# Patient Record
Sex: FEMALE | Race: WHITE | NOT HISPANIC OR LATINO | Employment: OTHER
[De-identification: names, ages, dates, MRNs, and addresses within clinical notes are randomized per-mention and may not be internally consistent; named-entity substitution may affect disease eponyms.]

---

## 2017-01-12 ENCOUNTER — RECORDS - HEALTHEAST (OUTPATIENT)
Dept: ADMINISTRATIVE | Facility: OTHER | Age: 77
End: 2017-01-12

## 2017-01-18 ENCOUNTER — COMMUNICATION - HEALTHEAST (OUTPATIENT)
Dept: INTERNAL MEDICINE | Facility: CLINIC | Age: 77
End: 2017-01-18

## 2017-05-03 ENCOUNTER — OFFICE VISIT - HEALTHEAST (OUTPATIENT)
Dept: INTERNAL MEDICINE | Facility: CLINIC | Age: 77
End: 2017-05-03

## 2017-05-03 DIAGNOSIS — I83.92 VARICOSE VEINS OF LEFT LOWER EXTREMITY: ICD-10-CM

## 2017-05-03 ASSESSMENT — MIFFLIN-ST. JEOR: SCORE: 1152.15

## 2017-05-19 ENCOUNTER — RECORDS - HEALTHEAST (OUTPATIENT)
Dept: ADMINISTRATIVE | Facility: OTHER | Age: 77
End: 2017-05-19

## 2017-06-09 ENCOUNTER — COMMUNICATION - HEALTHEAST (OUTPATIENT)
Dept: INTERNAL MEDICINE | Facility: CLINIC | Age: 77
End: 2017-06-09

## 2017-06-12 ENCOUNTER — COMMUNICATION - HEALTHEAST (OUTPATIENT)
Dept: INTERNAL MEDICINE | Facility: CLINIC | Age: 77
End: 2017-06-12

## 2017-06-20 ENCOUNTER — COMMUNICATION - HEALTHEAST (OUTPATIENT)
Dept: INTERNAL MEDICINE | Facility: CLINIC | Age: 77
End: 2017-06-20

## 2017-06-24 ENCOUNTER — COMMUNICATION - HEALTHEAST (OUTPATIENT)
Dept: INTERNAL MEDICINE | Facility: CLINIC | Age: 77
End: 2017-06-24

## 2017-06-29 ENCOUNTER — RECORDS - HEALTHEAST (OUTPATIENT)
Dept: ADMINISTRATIVE | Facility: OTHER | Age: 77
End: 2017-06-29

## 2017-07-18 ENCOUNTER — RECORDS - HEALTHEAST (OUTPATIENT)
Dept: ADMINISTRATIVE | Facility: OTHER | Age: 77
End: 2017-07-18

## 2017-07-26 ENCOUNTER — COMMUNICATION - HEALTHEAST (OUTPATIENT)
Dept: INTERNAL MEDICINE | Facility: CLINIC | Age: 77
End: 2017-07-26

## 2017-07-31 ENCOUNTER — AMBULATORY - HEALTHEAST (OUTPATIENT)
Dept: INTERNAL MEDICINE | Facility: CLINIC | Age: 77
End: 2017-07-31

## 2017-07-31 ENCOUNTER — COMMUNICATION - HEALTHEAST (OUTPATIENT)
Dept: INTERNAL MEDICINE | Facility: CLINIC | Age: 77
End: 2017-07-31

## 2017-08-01 ENCOUNTER — AMBULATORY - HEALTHEAST (OUTPATIENT)
Dept: INTERNAL MEDICINE | Facility: CLINIC | Age: 77
End: 2017-08-01

## 2017-08-31 ENCOUNTER — RECORDS - HEALTHEAST (OUTPATIENT)
Dept: ADMINISTRATIVE | Facility: OTHER | Age: 77
End: 2017-08-31

## 2017-09-05 ENCOUNTER — RECORDS - HEALTHEAST (OUTPATIENT)
Dept: ADMINISTRATIVE | Facility: OTHER | Age: 77
End: 2017-09-05

## 2017-09-07 ENCOUNTER — COMMUNICATION - HEALTHEAST (OUTPATIENT)
Dept: INTERNAL MEDICINE | Facility: CLINIC | Age: 77
End: 2017-09-07

## 2017-09-07 DIAGNOSIS — I10 HTN (HYPERTENSION): ICD-10-CM

## 2017-09-10 ENCOUNTER — COMMUNICATION - HEALTHEAST (OUTPATIENT)
Dept: INTERNAL MEDICINE | Facility: CLINIC | Age: 77
End: 2017-09-10

## 2017-09-21 ENCOUNTER — RECORDS - HEALTHEAST (OUTPATIENT)
Dept: ADMINISTRATIVE | Facility: OTHER | Age: 77
End: 2017-09-21

## 2017-09-25 ENCOUNTER — OFFICE VISIT - HEALTHEAST (OUTPATIENT)
Dept: INTERNAL MEDICINE | Facility: CLINIC | Age: 77
End: 2017-09-25

## 2017-09-25 DIAGNOSIS — E78.2 MIXED HYPERLIPIDEMIA: ICD-10-CM

## 2017-09-25 DIAGNOSIS — Z79.899 MEDICATION MANAGEMENT: ICD-10-CM

## 2017-09-25 DIAGNOSIS — Z23 NEED FOR VACCINATION: ICD-10-CM

## 2017-09-25 DIAGNOSIS — I10 HTN (HYPERTENSION): ICD-10-CM

## 2017-09-25 ASSESSMENT — MIFFLIN-ST. JEOR: SCORE: 1139.08

## 2017-10-02 ENCOUNTER — COMMUNICATION - HEALTHEAST (OUTPATIENT)
Dept: INTERNAL MEDICINE | Facility: CLINIC | Age: 77
End: 2017-10-02

## 2017-10-02 ENCOUNTER — HOSPITAL ENCOUNTER (OUTPATIENT)
Dept: ULTRASOUND IMAGING | Facility: CLINIC | Age: 77
Discharge: HOME | End: 2017-10-02
Attending: INTERNAL MEDICINE

## 2017-10-02 DIAGNOSIS — I10 HTN (HYPERTENSION): ICD-10-CM

## 2017-10-03 ENCOUNTER — AMBULATORY - HEALTHEAST (OUTPATIENT)
Dept: INTERNAL MEDICINE | Facility: CLINIC | Age: 77
End: 2017-10-03

## 2017-10-03 ENCOUNTER — COMMUNICATION - HEALTHEAST (OUTPATIENT)
Dept: INTERNAL MEDICINE | Facility: CLINIC | Age: 77
End: 2017-10-03

## 2017-10-03 DIAGNOSIS — E78.2 MIXED HYPERLIPIDEMIA: ICD-10-CM

## 2018-01-02 ENCOUNTER — COMMUNICATION - HEALTHEAST (OUTPATIENT)
Dept: INTERNAL MEDICINE | Facility: CLINIC | Age: 78
End: 2018-01-02

## 2018-01-02 ENCOUNTER — OFFICE VISIT - HEALTHEAST (OUTPATIENT)
Dept: INTERNAL MEDICINE | Facility: CLINIC | Age: 78
End: 2018-01-02

## 2018-01-02 DIAGNOSIS — F32.A DEPRESSION: ICD-10-CM

## 2018-01-02 DIAGNOSIS — F41.9 ANXIETY: ICD-10-CM

## 2018-01-02 ASSESSMENT — MIFFLIN-ST. JEOR: SCORE: 1138.54

## 2018-01-29 ENCOUNTER — COMMUNICATION - HEALTHEAST (OUTPATIENT)
Dept: SCHEDULING | Facility: CLINIC | Age: 78
End: 2018-01-29

## 2018-01-30 ENCOUNTER — COMMUNICATION - HEALTHEAST (OUTPATIENT)
Dept: INTERNAL MEDICINE | Facility: CLINIC | Age: 78
End: 2018-01-30

## 2018-01-30 ENCOUNTER — AMBULATORY - HEALTHEAST (OUTPATIENT)
Dept: INTERNAL MEDICINE | Facility: CLINIC | Age: 78
End: 2018-01-30

## 2018-01-30 ENCOUNTER — OFFICE VISIT - HEALTHEAST (OUTPATIENT)
Dept: INTERNAL MEDICINE | Facility: CLINIC | Age: 78
End: 2018-01-30

## 2018-01-30 DIAGNOSIS — Z86.711 PERSONAL HISTORY OF PE (PULMONARY EMBOLISM): ICD-10-CM

## 2018-01-30 DIAGNOSIS — I65.29 CAROTID STENOSIS: ICD-10-CM

## 2018-01-30 DIAGNOSIS — I49.9 IRREGULAR HEART RATE: ICD-10-CM

## 2018-01-30 DIAGNOSIS — R07.9 CHEST PAIN: ICD-10-CM

## 2018-01-30 DIAGNOSIS — R00.1 BRADYCARDIA: ICD-10-CM

## 2018-01-30 LAB
ANION GAP SERPL CALCULATED.3IONS-SCNC: 9 MMOL/L (ref 5–18)
BUN SERPL-MCNC: 17 MG/DL (ref 8–28)
CALCIUM SERPL-MCNC: 9.4 MG/DL (ref 8.5–10.5)
CHLORIDE BLD-SCNC: 106 MMOL/L (ref 98–107)
CO2 SERPL-SCNC: 27 MMOL/L (ref 22–31)
CREAT SERPL-MCNC: 0.83 MG/DL (ref 0.6–1.1)
GFR SERPL CREATININE-BSD FRML MDRD: >60 ML/MIN/1.73M2
GLUCOSE BLD-MCNC: 83 MG/DL (ref 70–125)
POTASSIUM BLD-SCNC: 4.3 MMOL/L (ref 3.5–5)
SODIUM SERPL-SCNC: 142 MMOL/L (ref 136–145)
TSH SERPL DL<=0.005 MIU/L-ACNC: 1.09 UIU/ML (ref 0.3–5)

## 2018-01-30 ASSESSMENT — MIFFLIN-ST. JEOR: SCORE: 1129.47

## 2018-01-31 LAB
ATRIAL RATE - MUSE: 51 BPM
DIASTOLIC BLOOD PRESSURE - MUSE: NORMAL MMHG
INTERPRETATION ECG - MUSE: NORMAL
P AXIS - MUSE: 62 DEGREES
PR INTERVAL - MUSE: 178 MS
QRS DURATION - MUSE: 96 MS
QT - MUSE: 442 MS
QTC - MUSE: 407 MS
R AXIS - MUSE: -38 DEGREES
SYSTOLIC BLOOD PRESSURE - MUSE: NORMAL MMHG
T AXIS - MUSE: -85 DEGREES
VENTRICULAR RATE- MUSE: 51 BPM

## 2018-02-07 ENCOUNTER — COMMUNICATION - HEALTHEAST (OUTPATIENT)
Dept: INTERNAL MEDICINE | Facility: CLINIC | Age: 78
End: 2018-02-07

## 2018-02-07 ENCOUNTER — HOSPITAL ENCOUNTER (OUTPATIENT)
Dept: CARDIOLOGY | Facility: HOSPITAL | Age: 78
Discharge: HOME | End: 2018-02-07
Attending: INTERNAL MEDICINE

## 2018-02-07 DIAGNOSIS — R00.1 BRADYCARDIA: ICD-10-CM

## 2018-02-07 DIAGNOSIS — I65.29 CAROTID STENOSIS: ICD-10-CM

## 2018-02-07 DIAGNOSIS — Z86.711 PERSONAL HISTORY OF PE (PULMONARY EMBOLISM): ICD-10-CM

## 2018-02-07 DIAGNOSIS — R07.9 CHEST PAIN: ICD-10-CM

## 2018-02-07 DIAGNOSIS — I49.9 IRREGULAR HEART RATE: ICD-10-CM

## 2018-02-07 LAB
AORTIC ROOT: 2.9 CM
AORTIC VALVE MEAN VELOCITY: 115 CM/S
ASCENDING AORTA: 3.1 CM
AV CUSP SEPERATION: 1.4 CM
AV CUSP SEPERATION: 1.4 CM
AV DIMENSIONLESS INDEX VTI: 0.6
AV MEAN GRADIENT: 6 MMHG
AV PEAK GRADIENT: 9.2 MMHG
AV VALVE AREA: 2.4 CM2
AV VELOCITY RATIO: 0.6
BSA FOR ECHO PROCEDURE: 1.76 M2
CV ECHO HEIGHT: 64 IN
CV ECHO WEIGHT: 152 LBS
DOP CALC AO PEAK VEL: 152 CM/S
DOP CALC AO VTI: 39.9 CM
DOP CALC LVOT AREA: 3.8 CM2
DOP CALC LVOT DIAMETER: 2.2 CM
DOP CALC LVOT PEAK VEL: 91.6 CM/S
DOP CALC LVOT STROKE VOLUME: 97.3 CM3
DOP CALC MV VTI: 25.2 CM
DOP CALCLVOT PEAK VEL VTI: 25.6 CM
EJECTION FRACTION: 68 % (ref 55–75)
FRACTIONAL SHORTENING: 32.3 % (ref 28–44)
INTERVENTRICULAR SEPTUM IN END DIASTOLE: 1.21 CM (ref 0.6–0.9)
IVS/PW RATIO: 1
LA AREA 1: 19.7 CM2
LA AREA 2: 16.9 CM2
LEFT ATRIUM LENGTH: 4.6 CM
LEFT ATRIUM SIZE: 2.8 CM
LEFT ATRIUM VOLUME INDEX: 35 ML/M2
LEFT ATRIUM VOLUME: 61.5 ML
LEFT VENTRICLE CARDIAC INDEX: 2.7 L/MIN/M2
LEFT VENTRICLE CARDIAC OUTPUT: 4.8 L/MIN
LEFT VENTRICLE DIASTOLIC VOLUME INDEX: 39.3 CM3/M2 (ref 34–74)
LEFT VENTRICLE DIASTOLIC VOLUME: 69.2 CM3 (ref 46–106)
LEFT VENTRICLE HEART RATE: 49 BPM
LEFT VENTRICLE MASS INDEX: 97.3 G/M2
LEFT VENTRICLE SYSTOLIC VOLUME INDEX: 12.4 CM3/M2 (ref 11–31)
LEFT VENTRICLE SYSTOLIC VOLUME: 21.9 CM3 (ref 14–42)
LEFT VENTRICULAR INTERNAL DIMENSION IN DIASTOLE: 4.06 CM (ref 3.8–5.2)
LEFT VENTRICULAR INTERNAL DIMENSION IN SYSTOLE: 2.75 CM (ref 2.2–3.5)
LEFT VENTRICULAR MASS: 171.3 G
LEFT VENTRICULAR OUTFLOW TRACT MEAN GRADIENT: 2 MMHG
LEFT VENTRICULAR OUTFLOW TRACT MEAN VELOCITY: 66.9 CM/S
LEFT VENTRICULAR OUTFLOW TRACT PEAK GRADIENT: 3 MMHG
LEFT VENTRICULAR POSTERIOR WALL IN END DIASTOLE: 1.21 CM (ref 0.6–0.9)
LV STROKE VOLUME INDEX: 55.3 ML/M2
MITRAL VALVE DECELERATION SLOPE: 1780 MM/S2
MITRAL VALVE E/A RATIO: 0.8
MITRAL VALVE MEAN INFLOW VELOCITY: 34.6 CM/S
MITRAL VALVE PEAK VELOCITY: 75.2 CM/S
MITRAL VALVE PRESSURE HALF-TIME: 98 MS
MV AREA VTI: 3.86 CM2
MV AVERAGE E/E' RATIO: 9.4 CM/S
MV DECELERATION TIME: 254 MS
MV E'TISSUE VEL-LAT: 7.16 CM/S
MV E'TISSUE VEL-MED: 5.22 CM/S
MV LATERAL E/E' RATIO: 8.1
MV MEAN GRADIENT: 1 MMHG
MV MEDIAL E/E' RATIO: 11.1
MV PEAK A VELOCITY: 72.3 CM/S
MV PEAK E VELOCITY: 58.2 CM/S
MV PEAK GRADIENT: 2.3 MMHG
MV VALVE AREA BY CONTINUITY EQUATION: 3.9 CM2
MV VALVE AREA PRESSURE 1/2 METHOD: 2.2 CM2
NUC REST DIASTOLIC VOLUME INDEX: 2432 LBS
NUC REST SYSTOLIC VOLUME INDEX: 64 IN

## 2018-02-07 ASSESSMENT — MIFFLIN-ST. JEOR: SCORE: 1129.47

## 2018-03-28 ENCOUNTER — COMMUNICATION - HEALTHEAST (OUTPATIENT)
Dept: INTERNAL MEDICINE | Facility: CLINIC | Age: 78
End: 2018-03-28

## 2018-06-05 ENCOUNTER — COMMUNICATION - HEALTHEAST (OUTPATIENT)
Dept: INTERNAL MEDICINE | Facility: CLINIC | Age: 78
End: 2018-06-05

## 2018-06-05 DIAGNOSIS — F32.A DEPRESSION: ICD-10-CM

## 2018-06-06 ENCOUNTER — COMMUNICATION - HEALTHEAST (OUTPATIENT)
Dept: INTERNAL MEDICINE | Facility: CLINIC | Age: 78
End: 2018-06-06

## 2018-06-06 DIAGNOSIS — F32.A DEPRESSION: ICD-10-CM

## 2018-07-18 ENCOUNTER — OFFICE VISIT - HEALTHEAST (OUTPATIENT)
Dept: INTERNAL MEDICINE | Facility: CLINIC | Age: 78
End: 2018-07-18

## 2018-07-18 DIAGNOSIS — I10 BENIGN ESSENTIAL HYPERTENSION: ICD-10-CM

## 2018-07-18 DIAGNOSIS — M79.671 RIGHT FOOT PAIN: ICD-10-CM

## 2018-07-18 ASSESSMENT — MIFFLIN-ST. JEOR: SCORE: 1143.08

## 2018-07-23 ENCOUNTER — RECORDS - HEALTHEAST (OUTPATIENT)
Dept: ADMINISTRATIVE | Facility: OTHER | Age: 78
End: 2018-07-23

## 2018-09-10 ENCOUNTER — RECORDS - HEALTHEAST (OUTPATIENT)
Dept: ADMINISTRATIVE | Facility: OTHER | Age: 78
End: 2018-09-10

## 2018-09-12 ENCOUNTER — RECORDS - HEALTHEAST (OUTPATIENT)
Dept: ADMINISTRATIVE | Facility: OTHER | Age: 78
End: 2018-09-12

## 2018-09-27 ENCOUNTER — COMMUNICATION - HEALTHEAST (OUTPATIENT)
Dept: INTERNAL MEDICINE | Facility: CLINIC | Age: 78
End: 2018-09-27

## 2018-09-27 DIAGNOSIS — I10 HTN (HYPERTENSION): ICD-10-CM

## 2018-09-27 DIAGNOSIS — E78.2 MIXED HYPERLIPIDEMIA: ICD-10-CM

## 2018-10-15 ENCOUNTER — RECORDS - HEALTHEAST (OUTPATIENT)
Dept: ADMINISTRATIVE | Facility: OTHER | Age: 78
End: 2018-10-15

## 2018-10-22 ENCOUNTER — OFFICE VISIT - HEALTHEAST (OUTPATIENT)
Dept: INTERNAL MEDICINE | Facility: CLINIC | Age: 78
End: 2018-10-22

## 2018-10-22 DIAGNOSIS — K62.9 ANAL LESION: ICD-10-CM

## 2018-10-22 DIAGNOSIS — Z01.818 PREOP GENERAL PHYSICAL EXAM: ICD-10-CM

## 2018-10-22 DIAGNOSIS — D68.4 COAGULATION FACTOR DEFICIENCY SYNDROME (H): ICD-10-CM

## 2018-10-22 DIAGNOSIS — I82.409 DVT (DEEP VENOUS THROMBOSIS) (H): ICD-10-CM

## 2018-10-22 LAB — HGB BLD-MCNC: 14 G/DL (ref 12–16)

## 2018-10-22 ASSESSMENT — MIFFLIN-ST. JEOR: SCORE: 1147.61

## 2018-10-23 LAB
ANION GAP SERPL CALCULATED.3IONS-SCNC: 12 MMOL/L (ref 5–18)
BUN SERPL-MCNC: 27 MG/DL (ref 8–28)
CALCIUM SERPL-MCNC: 9.9 MG/DL (ref 8.5–10.5)
CHLORIDE BLD-SCNC: 105 MMOL/L (ref 98–107)
CO2 SERPL-SCNC: 26 MMOL/L (ref 22–31)
CREAT SERPL-MCNC: 1.52 MG/DL (ref 0.6–1.1)
GFR SERPL CREATININE-BSD FRML MDRD: 33 ML/MIN/1.73M2
GLUCOSE BLD-MCNC: 92 MG/DL (ref 70–125)
POTASSIUM BLD-SCNC: 3.8 MMOL/L (ref 3.5–5)
SODIUM SERPL-SCNC: 143 MMOL/L (ref 136–145)

## 2018-10-25 ENCOUNTER — COMMUNICATION - HEALTHEAST (OUTPATIENT)
Dept: INTERNAL MEDICINE | Facility: CLINIC | Age: 78
End: 2018-10-25

## 2018-10-30 ENCOUNTER — COMMUNICATION - HEALTHEAST (OUTPATIENT)
Dept: INTERNAL MEDICINE | Facility: CLINIC | Age: 78
End: 2018-10-30

## 2018-11-06 ENCOUNTER — AMBULATORY - HEALTHEAST (OUTPATIENT)
Dept: INTERNAL MEDICINE | Facility: CLINIC | Age: 78
End: 2018-11-06

## 2018-11-06 ENCOUNTER — COMMUNICATION - HEALTHEAST (OUTPATIENT)
Dept: INTERNAL MEDICINE | Facility: CLINIC | Age: 78
End: 2018-11-06

## 2018-11-06 DIAGNOSIS — N18.1 CRF (CHRONIC RENAL FAILURE), STAGE 1: ICD-10-CM

## 2018-11-12 ENCOUNTER — RECORDS - HEALTHEAST (OUTPATIENT)
Dept: ADMINISTRATIVE | Facility: OTHER | Age: 78
End: 2018-11-12

## 2018-11-19 ENCOUNTER — COMMUNICATION - HEALTHEAST (OUTPATIENT)
Dept: TELEHEALTH | Facility: CLINIC | Age: 78
End: 2018-11-19

## 2018-11-19 ENCOUNTER — AMBULATORY - HEALTHEAST (OUTPATIENT)
Dept: LAB | Facility: CLINIC | Age: 78
End: 2018-11-19

## 2018-11-19 ENCOUNTER — COMMUNICATION - HEALTHEAST (OUTPATIENT)
Dept: INTERNAL MEDICINE | Facility: CLINIC | Age: 78
End: 2018-11-19

## 2018-11-19 DIAGNOSIS — N18.1 CRF (CHRONIC RENAL FAILURE), STAGE 1: ICD-10-CM

## 2018-11-19 LAB
ANION GAP SERPL CALCULATED.3IONS-SCNC: 12 MMOL/L (ref 5–18)
BUN SERPL-MCNC: 17 MG/DL (ref 8–28)
CALCIUM SERPL-MCNC: 9.9 MG/DL (ref 8.5–10.5)
CHLORIDE BLD-SCNC: 106 MMOL/L (ref 98–107)
CO2 SERPL-SCNC: 27 MMOL/L (ref 22–31)
CREAT SERPL-MCNC: 0.85 MG/DL (ref 0.6–1.1)
GFR SERPL CREATININE-BSD FRML MDRD: >60 ML/MIN/1.73M2
GLUCOSE BLD-MCNC: 97 MG/DL (ref 70–125)
POTASSIUM BLD-SCNC: 3.8 MMOL/L (ref 3.5–5)
SODIUM SERPL-SCNC: 145 MMOL/L (ref 136–145)

## 2018-11-27 ENCOUNTER — COMMUNICATION - HEALTHEAST (OUTPATIENT)
Dept: INTERNAL MEDICINE | Facility: CLINIC | Age: 78
End: 2018-11-27

## 2018-11-27 DIAGNOSIS — I10 HTN (HYPERTENSION): ICD-10-CM

## 2019-03-08 ENCOUNTER — COMMUNICATION - HEALTHEAST (OUTPATIENT)
Dept: INTERNAL MEDICINE | Facility: CLINIC | Age: 79
End: 2019-03-08

## 2019-03-08 DIAGNOSIS — I10 HTN (HYPERTENSION): ICD-10-CM

## 2019-05-27 ENCOUNTER — RECORDS - HEALTHEAST (OUTPATIENT)
Dept: ADMINISTRATIVE | Facility: OTHER | Age: 79
End: 2019-05-27

## 2019-05-28 ENCOUNTER — RECORDS - HEALTHEAST (OUTPATIENT)
Dept: ADMINISTRATIVE | Facility: OTHER | Age: 79
End: 2019-05-28

## 2019-05-29 ENCOUNTER — COMMUNICATION - HEALTHEAST (OUTPATIENT)
Dept: INTERNAL MEDICINE | Facility: CLINIC | Age: 79
End: 2019-05-29

## 2019-05-30 ENCOUNTER — RECORDS - HEALTHEAST (OUTPATIENT)
Dept: ADMINISTRATIVE | Facility: OTHER | Age: 79
End: 2019-05-30

## 2019-05-31 ENCOUNTER — RECORDS - HEALTHEAST (OUTPATIENT)
Dept: ADMINISTRATIVE | Facility: OTHER | Age: 79
End: 2019-05-31

## 2019-06-03 ENCOUNTER — COMMUNICATION - HEALTHEAST (OUTPATIENT)
Dept: INTERNAL MEDICINE | Facility: CLINIC | Age: 79
End: 2019-06-03

## 2019-06-03 ENCOUNTER — RECORDS - HEALTHEAST (OUTPATIENT)
Dept: ADMINISTRATIVE | Facility: OTHER | Age: 79
End: 2019-06-03

## 2019-06-04 ENCOUNTER — COMMUNICATION - HEALTHEAST (OUTPATIENT)
Dept: INTERNAL MEDICINE | Facility: CLINIC | Age: 79
End: 2019-06-04

## 2019-06-05 ENCOUNTER — RECORDS - HEALTHEAST (OUTPATIENT)
Dept: ADMINISTRATIVE | Facility: OTHER | Age: 79
End: 2019-06-05

## 2019-06-10 ENCOUNTER — RECORDS - HEALTHEAST (OUTPATIENT)
Dept: ADMINISTRATIVE | Facility: OTHER | Age: 79
End: 2019-06-10

## 2019-06-11 ENCOUNTER — RECORDS - HEALTHEAST (OUTPATIENT)
Dept: ADMINISTRATIVE | Facility: OTHER | Age: 79
End: 2019-06-11

## 2019-06-12 ENCOUNTER — RECORDS - HEALTHEAST (OUTPATIENT)
Dept: ADMINISTRATIVE | Facility: OTHER | Age: 79
End: 2019-06-12

## 2019-06-13 ENCOUNTER — RECORDS - HEALTHEAST (OUTPATIENT)
Dept: ADMINISTRATIVE | Facility: OTHER | Age: 79
End: 2019-06-13

## 2019-06-14 ENCOUNTER — RECORDS - HEALTHEAST (OUTPATIENT)
Dept: ADMINISTRATIVE | Facility: OTHER | Age: 79
End: 2019-06-14

## 2019-06-17 ENCOUNTER — COMMUNICATION - HEALTHEAST (OUTPATIENT)
Dept: INTERNAL MEDICINE | Facility: CLINIC | Age: 79
End: 2019-06-17

## 2019-06-17 DIAGNOSIS — E78.2 MIXED HYPERLIPIDEMIA: ICD-10-CM

## 2019-06-17 DIAGNOSIS — I10 HTN (HYPERTENSION): ICD-10-CM

## 2019-06-18 ENCOUNTER — COMMUNICATION - HEALTHEAST (OUTPATIENT)
Dept: INTERNAL MEDICINE | Facility: CLINIC | Age: 79
End: 2019-06-18

## 2019-06-19 ENCOUNTER — RECORDS - HEALTHEAST (OUTPATIENT)
Dept: ADMINISTRATIVE | Facility: OTHER | Age: 79
End: 2019-06-19

## 2019-06-21 ENCOUNTER — RECORDS - HEALTHEAST (OUTPATIENT)
Dept: ADMINISTRATIVE | Facility: OTHER | Age: 79
End: 2019-06-21

## 2019-06-24 ENCOUNTER — RECORDS - HEALTHEAST (OUTPATIENT)
Dept: ADMINISTRATIVE | Facility: OTHER | Age: 79
End: 2019-06-24

## 2019-06-26 ENCOUNTER — RECORDS - HEALTHEAST (OUTPATIENT)
Dept: ADMINISTRATIVE | Facility: OTHER | Age: 79
End: 2019-06-26

## 2019-07-02 ENCOUNTER — COMMUNICATION - HEALTHEAST (OUTPATIENT)
Dept: INTERNAL MEDICINE | Facility: CLINIC | Age: 79
End: 2019-07-02

## 2019-07-09 ENCOUNTER — RECORDS - HEALTHEAST (OUTPATIENT)
Dept: ADMINISTRATIVE | Facility: OTHER | Age: 79
End: 2019-07-09

## 2019-07-10 ENCOUNTER — RECORDS - HEALTHEAST (OUTPATIENT)
Dept: ADMINISTRATIVE | Facility: OTHER | Age: 79
End: 2019-07-10

## 2019-07-15 ENCOUNTER — RECORDS - HEALTHEAST (OUTPATIENT)
Dept: ADMINISTRATIVE | Facility: OTHER | Age: 79
End: 2019-07-15

## 2019-07-16 ENCOUNTER — RECORDS - HEALTHEAST (OUTPATIENT)
Dept: ADMINISTRATIVE | Facility: OTHER | Age: 79
End: 2019-07-16

## 2019-07-22 ENCOUNTER — RECORDS - HEALTHEAST (OUTPATIENT)
Dept: ADMINISTRATIVE | Facility: OTHER | Age: 79
End: 2019-07-22

## 2019-07-24 ENCOUNTER — COMMUNICATION - HEALTHEAST (OUTPATIENT)
Dept: INTERNAL MEDICINE | Facility: CLINIC | Age: 79
End: 2019-07-24

## 2019-07-25 ENCOUNTER — COMMUNICATION - HEALTHEAST (OUTPATIENT)
Dept: SCHEDULING | Facility: CLINIC | Age: 79
End: 2019-07-25

## 2019-07-29 ENCOUNTER — OFFICE VISIT - HEALTHEAST (OUTPATIENT)
Dept: INTERNAL MEDICINE | Facility: CLINIC | Age: 79
End: 2019-07-29

## 2019-07-29 ENCOUNTER — RECORDS - HEALTHEAST (OUTPATIENT)
Dept: ADMINISTRATIVE | Facility: OTHER | Age: 79
End: 2019-07-29

## 2019-07-29 DIAGNOSIS — F41.9 ANXIETY: ICD-10-CM

## 2019-07-29 DIAGNOSIS — I82.5Y9 CHRONIC DEEP VEIN THROMBOSIS (DVT) OF PROXIMAL VEIN OF LOWER EXTREMITY, UNSPECIFIED LATERALITY (H): ICD-10-CM

## 2019-07-29 DIAGNOSIS — E78.5 DYSLIPIDEMIA: ICD-10-CM

## 2019-07-29 DIAGNOSIS — R00.2 PALPITATIONS: ICD-10-CM

## 2019-07-29 DIAGNOSIS — I10 BENIGN ESSENTIAL HYPERTENSION: ICD-10-CM

## 2019-07-29 LAB
ANION GAP SERPL CALCULATED.3IONS-SCNC: 12 MMOL/L (ref 5–18)
ATRIAL RATE - MUSE: 62 BPM
BUN SERPL-MCNC: 12 MG/DL (ref 8–28)
CALCIUM SERPL-MCNC: 10.4 MG/DL (ref 8.5–10.5)
CHLORIDE BLD-SCNC: 103 MMOL/L (ref 98–107)
CHOLEST SERPL-MCNC: 266 MG/DL
CO2 SERPL-SCNC: 25 MMOL/L (ref 22–31)
CREAT SERPL-MCNC: 0.75 MG/DL (ref 0.6–1.1)
DIASTOLIC BLOOD PRESSURE - MUSE: NORMAL MMHG
FASTING STATUS PATIENT QL REPORTED: YES
GFR SERPL CREATININE-BSD FRML MDRD: >60 ML/MIN/1.73M2
GLUCOSE BLD-MCNC: 89 MG/DL (ref 70–125)
HDLC SERPL-MCNC: 85 MG/DL
INR PPP: 2.21 (ref 0.9–1.1)
INTERPRETATION ECG - MUSE: NORMAL
LDLC SERPL CALC-MCNC: 148 MG/DL
MAGNESIUM SERPL-MCNC: 1.9 MG/DL (ref 1.8–2.6)
P AXIS - MUSE: 37 DEGREES
POTASSIUM BLD-SCNC: 3.5 MMOL/L (ref 3.5–5)
PR INTERVAL - MUSE: 150 MS
QRS DURATION - MUSE: 92 MS
QT - MUSE: 438 MS
QTC - MUSE: 444 MS
R AXIS - MUSE: -39 DEGREES
SODIUM SERPL-SCNC: 140 MMOL/L (ref 136–145)
SYSTOLIC BLOOD PRESSURE - MUSE: NORMAL MMHG
T AXIS - MUSE: 247 DEGREES
TRIGL SERPL-MCNC: 166 MG/DL
TSH SERPL DL<=0.005 MIU/L-ACNC: 0.77 UIU/ML (ref 0.3–5)
VENTRICULAR RATE- MUSE: 62 BPM

## 2019-07-30 ENCOUNTER — COMMUNICATION - HEALTHEAST (OUTPATIENT)
Dept: INTERNAL MEDICINE | Facility: CLINIC | Age: 79
End: 2019-07-30

## 2019-08-06 ENCOUNTER — COMMUNICATION - HEALTHEAST (OUTPATIENT)
Dept: INTERNAL MEDICINE | Facility: CLINIC | Age: 79
End: 2019-08-06

## 2019-08-07 ENCOUNTER — COMMUNICATION - HEALTHEAST (OUTPATIENT)
Dept: INTERNAL MEDICINE | Facility: CLINIC | Age: 79
End: 2019-08-07

## 2019-08-13 ENCOUNTER — RECORDS - HEALTHEAST (OUTPATIENT)
Dept: ADMINISTRATIVE | Facility: OTHER | Age: 79
End: 2019-08-13

## 2019-08-15 ENCOUNTER — RECORDS - HEALTHEAST (OUTPATIENT)
Dept: ADMINISTRATIVE | Facility: OTHER | Age: 79
End: 2019-08-15

## 2019-08-19 ENCOUNTER — RECORDS - HEALTHEAST (OUTPATIENT)
Dept: ADMINISTRATIVE | Facility: OTHER | Age: 79
End: 2019-08-19

## 2019-08-20 ENCOUNTER — RECORDS - HEALTHEAST (OUTPATIENT)
Dept: ADMINISTRATIVE | Facility: OTHER | Age: 79
End: 2019-08-20

## 2019-08-22 ENCOUNTER — RECORDS - HEALTHEAST (OUTPATIENT)
Dept: ADMINISTRATIVE | Facility: OTHER | Age: 79
End: 2019-08-22

## 2019-08-22 ENCOUNTER — COMMUNICATION - HEALTHEAST (OUTPATIENT)
Dept: INTERNAL MEDICINE | Facility: CLINIC | Age: 79
End: 2019-08-22

## 2019-08-25 ENCOUNTER — COMMUNICATION - HEALTHEAST (OUTPATIENT)
Dept: INTERNAL MEDICINE | Facility: CLINIC | Age: 79
End: 2019-08-25

## 2019-08-25 DIAGNOSIS — I82.5Y9 CHRONIC DEEP VEIN THROMBOSIS (DVT) OF PROXIMAL VEIN OF LOWER EXTREMITY, UNSPECIFIED LATERALITY (H): ICD-10-CM

## 2019-08-26 ENCOUNTER — RECORDS - HEALTHEAST (OUTPATIENT)
Dept: ADMINISTRATIVE | Facility: OTHER | Age: 79
End: 2019-08-26

## 2019-08-26 ENCOUNTER — HOSPITAL ENCOUNTER (OUTPATIENT)
Dept: ULTRASOUND IMAGING | Facility: HOSPITAL | Age: 79
Discharge: HOME | End: 2019-08-26

## 2019-08-26 ENCOUNTER — RECORDS - HEALTHEAST (OUTPATIENT)
Dept: RADIOLOGY | Facility: CLINIC | Age: 79
End: 2019-08-26

## 2019-08-26 DIAGNOSIS — Z09 FOLLOW UP: ICD-10-CM

## 2019-08-28 ENCOUNTER — OFFICE VISIT - HEALTHEAST (OUTPATIENT)
Dept: INTERNAL MEDICINE | Facility: CLINIC | Age: 79
End: 2019-08-28

## 2019-08-28 DIAGNOSIS — D68.4 COAGULATION FACTOR DEFICIENCY SYNDROME (H): ICD-10-CM

## 2019-08-28 DIAGNOSIS — I10 BENIGN ESSENTIAL HYPERTENSION: ICD-10-CM

## 2019-08-28 DIAGNOSIS — F41.9 ANXIETY: ICD-10-CM

## 2019-08-28 DIAGNOSIS — I82.5Y9 CHRONIC DEEP VEIN THROMBOSIS (DVT) OF PROXIMAL VEIN OF LOWER EXTREMITY, UNSPECIFIED LATERALITY (H): ICD-10-CM

## 2019-08-28 ASSESSMENT — MIFFLIN-ST. JEOR: SCORE: 1134

## 2019-08-29 ENCOUNTER — COMMUNICATION - HEALTHEAST (OUTPATIENT)
Dept: INTERNAL MEDICINE | Facility: CLINIC | Age: 79
End: 2019-08-29

## 2019-08-29 DIAGNOSIS — D68.4 COAGULATION FACTOR DEFICIENCY SYNDROME (H): ICD-10-CM

## 2019-08-30 ENCOUNTER — RECORDS - HEALTHEAST (OUTPATIENT)
Dept: ADMINISTRATIVE | Facility: OTHER | Age: 79
End: 2019-08-30

## 2019-09-16 ENCOUNTER — RECORDS - HEALTHEAST (OUTPATIENT)
Dept: ADMINISTRATIVE | Facility: OTHER | Age: 79
End: 2019-09-16

## 2019-09-18 ENCOUNTER — COMMUNICATION - HEALTHEAST (OUTPATIENT)
Dept: INTERNAL MEDICINE | Facility: CLINIC | Age: 79
End: 2019-09-18

## 2019-09-19 ENCOUNTER — OFFICE VISIT - HEALTHEAST (OUTPATIENT)
Dept: INTERNAL MEDICINE | Facility: CLINIC | Age: 79
End: 2019-09-19

## 2019-09-19 DIAGNOSIS — F41.9 ANXIETY: ICD-10-CM

## 2019-09-19 DIAGNOSIS — F51.01 PRIMARY INSOMNIA: ICD-10-CM

## 2019-09-19 ASSESSMENT — ANXIETY QUESTIONNAIRES
3. WORRYING TOO MUCH ABOUT DIFFERENT THINGS: NEARLY EVERY DAY
5. BEING SO RESTLESS THAT IT IS HARD TO SIT STILL: NOT AT ALL
6. BECOMING EASILY ANNOYED OR IRRITABLE: MORE THAN HALF THE DAYS
7. FEELING AFRAID AS IF SOMETHING AWFUL MIGHT HAPPEN: NEARLY EVERY DAY
IF YOU CHECKED OFF ANY PROBLEMS ON THIS QUESTIONNAIRE, HOW DIFFICULT HAVE THESE PROBLEMS MADE IT FOR YOU TO DO YOUR WORK, TAKE CARE OF THINGS AT HOME, OR GET ALONG WITH OTHER PEOPLE: VERY DIFFICULT
2. NOT BEING ABLE TO STOP OR CONTROL WORRYING: NEARLY EVERY DAY
1. FEELING NERVOUS, ANXIOUS, OR ON EDGE: NEARLY EVERY DAY
GAD7 TOTAL SCORE: 17
4. TROUBLE RELAXING: NEARLY EVERY DAY

## 2019-09-24 ENCOUNTER — COMMUNICATION - HEALTHEAST (OUTPATIENT)
Dept: INTERNAL MEDICINE | Facility: CLINIC | Age: 79
End: 2019-09-24

## 2019-09-24 ENCOUNTER — AMBULATORY - HEALTHEAST (OUTPATIENT)
Dept: INTERNAL MEDICINE | Facility: CLINIC | Age: 79
End: 2019-09-24

## 2019-09-24 DIAGNOSIS — F51.01 PRIMARY INSOMNIA: ICD-10-CM

## 2019-09-27 ENCOUNTER — COMMUNICATION - HEALTHEAST (OUTPATIENT)
Dept: INTERNAL MEDICINE | Facility: CLINIC | Age: 79
End: 2019-09-27

## 2019-09-27 DIAGNOSIS — E78.2 MIXED HYPERLIPIDEMIA: ICD-10-CM

## 2019-10-14 ENCOUNTER — OFFICE VISIT - HEALTHEAST (OUTPATIENT)
Dept: INTERNAL MEDICINE | Facility: CLINIC | Age: 79
End: 2019-10-14

## 2019-10-14 DIAGNOSIS — R35.0 URINARY FREQUENCY: ICD-10-CM

## 2019-10-14 DIAGNOSIS — I82.5Y9 CHRONIC DEEP VEIN THROMBOSIS (DVT) OF PROXIMAL VEIN OF LOWER EXTREMITY, UNSPECIFIED LATERALITY (H): ICD-10-CM

## 2019-10-14 DIAGNOSIS — F51.04 CHRONIC INSOMNIA: ICD-10-CM

## 2019-10-14 DIAGNOSIS — D68.4 COAGULATION FACTOR DEFICIENCY SYNDROME (H): ICD-10-CM

## 2019-10-14 LAB
ALBUMIN UR-MCNC: NEGATIVE MG/DL
APPEARANCE UR: CLEAR
BACTERIA #/AREA URNS HPF: ABNORMAL HPF
BILIRUB UR QL STRIP: NEGATIVE
COLOR UR AUTO: YELLOW
GLUCOSE UR STRIP-MCNC: NEGATIVE MG/DL
HGB UR QL STRIP: ABNORMAL
KETONES UR STRIP-MCNC: NEGATIVE MG/DL
LEUKOCYTE ESTERASE UR QL STRIP: NEGATIVE
NITRATE UR QL: NEGATIVE
PH UR STRIP: 6 [PH] (ref 5–8)
RBC #/AREA URNS AUTO: ABNORMAL HPF
SP GR UR STRIP: 1.01 (ref 1–1.03)
SQUAMOUS #/AREA URNS AUTO: ABNORMAL LPF
UROBILINOGEN UR STRIP-ACNC: ABNORMAL
WBC #/AREA URNS AUTO: ABNORMAL HPF

## 2019-10-14 ASSESSMENT — MIFFLIN-ST. JEOR: SCORE: 1161.22

## 2019-10-28 ENCOUNTER — COMMUNICATION - HEALTHEAST (OUTPATIENT)
Dept: INTERNAL MEDICINE | Facility: CLINIC | Age: 79
End: 2019-10-28

## 2019-10-28 ENCOUNTER — RECORDS - HEALTHEAST (OUTPATIENT)
Dept: ADMINISTRATIVE | Facility: OTHER | Age: 79
End: 2019-10-28

## 2019-10-28 DIAGNOSIS — I10 HTN (HYPERTENSION): ICD-10-CM

## 2019-11-19 ENCOUNTER — COMMUNICATION - HEALTHEAST (OUTPATIENT)
Dept: INTERNAL MEDICINE | Facility: CLINIC | Age: 79
End: 2019-11-19

## 2019-11-19 DIAGNOSIS — I10 HTN (HYPERTENSION): ICD-10-CM

## 2019-11-20 ENCOUNTER — COMMUNICATION - HEALTHEAST (OUTPATIENT)
Dept: INTERNAL MEDICINE | Facility: CLINIC | Age: 79
End: 2019-11-20

## 2019-11-20 DIAGNOSIS — I10 HTN (HYPERTENSION): ICD-10-CM

## 2019-12-03 ENCOUNTER — COMMUNICATION - HEALTHEAST (OUTPATIENT)
Dept: INTERNAL MEDICINE | Facility: CLINIC | Age: 79
End: 2019-12-03

## 2019-12-03 DIAGNOSIS — I82.5Y9 CHRONIC DEEP VEIN THROMBOSIS (DVT) OF PROXIMAL VEIN OF LOWER EXTREMITY, UNSPECIFIED LATERALITY (H): ICD-10-CM

## 2020-01-26 ENCOUNTER — COMMUNICATION - HEALTHEAST (OUTPATIENT)
Dept: INTERNAL MEDICINE | Facility: CLINIC | Age: 80
End: 2020-01-26

## 2020-01-26 DIAGNOSIS — D68.4 COAGULATION FACTOR DEFICIENCY SYNDROME (H): ICD-10-CM

## 2020-02-18 ENCOUNTER — COMMUNICATION - HEALTHEAST (OUTPATIENT)
Dept: INTERNAL MEDICINE | Facility: CLINIC | Age: 80
End: 2020-02-18

## 2020-02-18 DIAGNOSIS — I82.5Y9 CHRONIC DEEP VEIN THROMBOSIS (DVT) OF PROXIMAL VEIN OF LOWER EXTREMITY, UNSPECIFIED LATERALITY (H): ICD-10-CM

## 2020-03-17 ENCOUNTER — COMMUNICATION - HEALTHEAST (OUTPATIENT)
Dept: INTERNAL MEDICINE | Facility: CLINIC | Age: 80
End: 2020-03-17

## 2020-03-17 DIAGNOSIS — D68.4 COAGULATION FACTOR DEFICIENCY SYNDROME (H): ICD-10-CM

## 2020-07-31 ENCOUNTER — COMMUNICATION - HEALTHEAST (OUTPATIENT)
Dept: INTERNAL MEDICINE | Facility: CLINIC | Age: 80
End: 2020-07-31

## 2020-08-02 ENCOUNTER — COMMUNICATION - HEALTHEAST (OUTPATIENT)
Dept: INTERNAL MEDICINE | Facility: CLINIC | Age: 80
End: 2020-08-02

## 2020-08-07 ENCOUNTER — COMMUNICATION - HEALTHEAST (OUTPATIENT)
Dept: INTERNAL MEDICINE | Facility: CLINIC | Age: 80
End: 2020-08-07

## 2020-08-14 ENCOUNTER — COMMUNICATION - HEALTHEAST (OUTPATIENT)
Dept: INTERNAL MEDICINE | Facility: CLINIC | Age: 80
End: 2020-08-14

## 2020-08-14 DIAGNOSIS — I10 HTN (HYPERTENSION): ICD-10-CM

## 2020-08-19 ENCOUNTER — RECORDS - HEALTHEAST (OUTPATIENT)
Dept: ADMINISTRATIVE | Facility: OTHER | Age: 80
End: 2020-08-19

## 2020-08-28 ENCOUNTER — COMMUNICATION - HEALTHEAST (OUTPATIENT)
Dept: INTERNAL MEDICINE | Facility: CLINIC | Age: 80
End: 2020-08-28

## 2020-08-31 ENCOUNTER — AMBULATORY - HEALTHEAST (OUTPATIENT)
Dept: INTERNAL MEDICINE | Facility: CLINIC | Age: 80
End: 2020-08-31

## 2020-08-31 DIAGNOSIS — F51.01 PRIMARY INSOMNIA: ICD-10-CM

## 2020-09-01 ENCOUNTER — COMMUNICATION - HEALTHEAST (OUTPATIENT)
Dept: INTERNAL MEDICINE | Facility: CLINIC | Age: 80
End: 2020-09-01

## 2020-09-01 DIAGNOSIS — F51.01 PRIMARY INSOMNIA: ICD-10-CM

## 2020-09-01 DIAGNOSIS — F41.9 ANXIETY: ICD-10-CM

## 2020-09-23 ENCOUNTER — OFFICE VISIT - HEALTHEAST (OUTPATIENT)
Dept: INTERNAL MEDICINE | Facility: CLINIC | Age: 80
End: 2020-09-23

## 2020-09-23 ENCOUNTER — COMMUNICATION - HEALTHEAST (OUTPATIENT)
Dept: INTERNAL MEDICINE | Facility: CLINIC | Age: 80
End: 2020-09-23

## 2020-09-23 DIAGNOSIS — F32.A DEPRESSION, UNSPECIFIED DEPRESSION TYPE: ICD-10-CM

## 2020-09-23 DIAGNOSIS — Z23 NEED FOR VACCINATION: ICD-10-CM

## 2020-09-23 DIAGNOSIS — E78.2 MIXED HYPERLIPIDEMIA: ICD-10-CM

## 2020-09-23 DIAGNOSIS — D68.4 COAGULATION FACTOR DEFICIENCY SYNDROME (H): ICD-10-CM

## 2020-09-23 DIAGNOSIS — F41.9 ANXIETY: ICD-10-CM

## 2020-09-23 DIAGNOSIS — I10 BENIGN ESSENTIAL HYPERTENSION: ICD-10-CM

## 2020-09-23 LAB
CHOLEST SERPL-MCNC: 218 MG/DL
FASTING STATUS PATIENT QL REPORTED: YES
HDLC SERPL-MCNC: 79 MG/DL
LDLC SERPL CALC-MCNC: 119 MG/DL
TRIGL SERPL-MCNC: 100 MG/DL

## 2020-09-23 ASSESSMENT — PATIENT HEALTH QUESTIONNAIRE - PHQ9: SUM OF ALL RESPONSES TO PHQ QUESTIONS 1-9: 5

## 2020-09-23 ASSESSMENT — MIFFLIN-ST. JEOR: SCORE: 1143.08

## 2020-09-25 ENCOUNTER — COMMUNICATION - HEALTHEAST (OUTPATIENT)
Dept: INTERNAL MEDICINE | Facility: CLINIC | Age: 80
End: 2020-09-25

## 2020-09-25 DIAGNOSIS — E78.2 MIXED HYPERLIPIDEMIA: ICD-10-CM

## 2020-09-28 ENCOUNTER — COMMUNICATION - HEALTHEAST (OUTPATIENT)
Dept: INTERNAL MEDICINE | Facility: CLINIC | Age: 80
End: 2020-09-28

## 2020-09-28 DIAGNOSIS — D68.4 COAGULATION FACTOR DEFICIENCY SYNDROME (H): ICD-10-CM

## 2020-10-05 ENCOUNTER — NURSE TRIAGE (OUTPATIENT)
Dept: NURSING | Facility: CLINIC | Age: 80
End: 2020-10-05

## 2020-10-05 NOTE — TELEPHONE ENCOUNTER
"Caller  Just found out she has had a\"third generation exposure to Covid 19; second generation not being tested for several days  as  contact was yesterday  Caller has little information regarding original source but knows he is not ill   Triage protocol reviewed   Advised to  quarantine pending  Second generation outcome of testing and illness; may need to quarantine up to  14 days   Caller is advised to call back  With further questions as more information is available especially if second generation source hs  test results   Caller understands and will comply   Destiny Baez RN  FNA    Reason for Disposition    [1] COVID-19 EXPOSURE (Close Contact) AND [2] within last 14 days BUT [3] NO symptoms    Additional Information    Negative: COVID-19 has been diagnosed by a healthcare provider (HCP)    Negative: COVID-19 lab test positive    Negative: [1] Symptoms of COVID-19 (e.g., cough, fever, SOB, or others) AND [2] lives in an area with community spread    Negative: [1] Symptoms of COVID-19 (e.g., cough, fever, SOB, or others) AND [2] within 14 days of EXPOSURE (close contact) with diagnosed or suspected COVID-19 patient    Negative: [1] Symptoms of COVID-19 (e.g., cough, fever, SOB, or others) AND [2] within 14 days of travel from high-risk area for COVID-19 community spread (identified by CDC)    Negative: [1] Difficulty breathing (shortness of breath) occurs AND [2] onset > 14 days after COVID-19 EXPOSURE (Close Contact) AND [3] no community spread where patient lives    Negative: [1] Dry cough occurs AND [2] onset > 14 days after COVID-19 EXPOSURE AND [3] no community spread where patient lives    Negative: [1] Wet cough (i.e., white-yellow, yellow, green, or jack colored sputum) AND [2] onset > 14 days after COVID-19 EXPOSURE AND [3] no community spread where patient lives    Negative: [1] Common cold symptoms AND [2] onset > 14 days after COVID-19 EXPOSURE AND [3] no community spread where patient lives    " Negative: [1] COVID-19 EXPOSURE (Close Contact) within last 14 days AND [2] needs COVID-19 lab test to return to work AND [3] NO symptoms    Negative: [1] COVID-19 EXPOSURE (Close Contact) within last 14 days AND [2] exposed person is a healthcare worker who was NOT using all recommended personal protective equipment (i.e., a respirator-N95 mask, eye protection, gloves, and gown) AND [3] NO symptoms    Protocols used: CORONAVIRUS (COVID-19) EXPOSURE-[facility] 8.4.20  COVID 19 Nurse Triage Plan/Patient Instructions    Please be aware that novel coronavirus (COVID-19) may be circulating in the community. If you develop symptoms such as fever, cough, or SOB or if you have concerns about the presence of another infection including coronavirus (COVID-19), please contact your health care provider or visit [email or web address].     Disposition/Instructions    Home care recommended. Follow home care protocol based instructions.    Thank you for taking steps to prevent the spread of this virus.  o Limit your contact with others.  o Wear a simple mask to cover your cough.  o Wash your hands well and often.    Resources    [facility]: About COVID-19: [email or web address]    CDC: What to Do If You're Sick: www.cdc.gov/coronavirus/2019-ncov/about/steps-when-sick.html    CDC: Ending Home Isolation: www.cdc.gov/coronavirus/2019-ncov/hcp/disposition-in-home-patients.html     CDC: Caring for Someone: www.cdc.gov/coronavirus/2019-ncov/if-you-are-sick/care-for-someone.html     [facility]: Interim Guidance for Hospital Discharge to Home: www.health.[facility].[place]/diseases/coronavirus/hcp/hospdischarge.pdf    [facility] clinical trials (COVID-19 research studies): [email or web address].[facility]/umn-clinical-trials     Below are the COVID-19 hotlines at the [place] Department of Health ([facility]). Interpreters are available.   o For health questions: Call 111-220-4844 or 1-402.364.7520 (7 a.m. to 7 p.m.)  o For questions about schools  and childcare: Call 568-226-9307 or 1-352.289.8227 (7 a.m. to 7 p.m.)

## 2020-10-14 ENCOUNTER — COMMUNICATION - HEALTHEAST (OUTPATIENT)
Dept: INTERNAL MEDICINE | Facility: CLINIC | Age: 80
End: 2020-10-14

## 2020-10-14 DIAGNOSIS — F41.9 ANXIETY: ICD-10-CM

## 2020-10-26 ENCOUNTER — COMMUNICATION - HEALTHEAST (OUTPATIENT)
Dept: INTERNAL MEDICINE | Facility: CLINIC | Age: 80
End: 2020-10-26

## 2020-10-26 DIAGNOSIS — I10 HTN (HYPERTENSION): ICD-10-CM

## 2020-10-28 ENCOUNTER — COMMUNICATION - HEALTHEAST (OUTPATIENT)
Dept: INTERNAL MEDICINE | Facility: CLINIC | Age: 80
End: 2020-10-28

## 2020-10-28 DIAGNOSIS — I10 HTN (HYPERTENSION): ICD-10-CM

## 2020-11-30 ENCOUNTER — OFFICE VISIT - HEALTHEAST (OUTPATIENT)
Dept: INTERNAL MEDICINE | Facility: CLINIC | Age: 80
End: 2020-11-30

## 2020-11-30 DIAGNOSIS — F51.04 CHRONIC INSOMNIA: ICD-10-CM

## 2020-11-30 DIAGNOSIS — F32.A DEPRESSION, UNSPECIFIED DEPRESSION TYPE: ICD-10-CM

## 2020-11-30 ASSESSMENT — MIFFLIN-ST. JEOR: SCORE: 1152.15

## 2020-12-24 ENCOUNTER — AMBULATORY - HEALTHEAST (OUTPATIENT)
Dept: INTERNAL MEDICINE | Facility: CLINIC | Age: 80
End: 2020-12-24

## 2020-12-24 ENCOUNTER — COMMUNICATION - HEALTHEAST (OUTPATIENT)
Dept: SCHEDULING | Facility: CLINIC | Age: 80
End: 2020-12-24

## 2020-12-24 ENCOUNTER — NURSE TRIAGE (OUTPATIENT)
Dept: NURSING | Facility: CLINIC | Age: 80
End: 2020-12-24

## 2020-12-24 DIAGNOSIS — F41.9 ANXIETY: ICD-10-CM

## 2020-12-24 NOTE — TELEPHONE ENCOUNTER
Fabi is calling and states that MD Marcelo ordered 10 mg of Lexapro and is taking 10 mg of lexapro a day and can only sleep 4 hours at a time.  In early morining 3am and 4am cannot go back to sleep.  Fabi is requesting to speak with MD Marcelo about getting off Lexapro and if she can take Buspirone.  Please phone Fabi.

## 2020-12-30 ENCOUNTER — COMMUNICATION - HEALTHEAST (OUTPATIENT)
Dept: INTERNAL MEDICINE | Facility: CLINIC | Age: 80
End: 2020-12-30

## 2021-01-05 ENCOUNTER — OFFICE VISIT - HEALTHEAST (OUTPATIENT)
Dept: INTERNAL MEDICINE | Facility: CLINIC | Age: 81
End: 2021-01-05

## 2021-01-05 DIAGNOSIS — E78.2 MIXED HYPERLIPIDEMIA: ICD-10-CM

## 2021-01-05 DIAGNOSIS — F32.A DEPRESSION, UNSPECIFIED DEPRESSION TYPE: ICD-10-CM

## 2021-01-05 DIAGNOSIS — I10 BENIGN ESSENTIAL HYPERTENSION: ICD-10-CM

## 2021-01-05 DIAGNOSIS — I10 HTN (HYPERTENSION): ICD-10-CM

## 2021-01-05 ASSESSMENT — MIFFLIN-ST. JEOR: SCORE: 1152.15

## 2021-01-20 ENCOUNTER — COMMUNICATION - HEALTHEAST (OUTPATIENT)
Dept: SCHEDULING | Facility: CLINIC | Age: 81
End: 2021-01-20

## 2021-01-20 ENCOUNTER — COMMUNICATION - HEALTHEAST (OUTPATIENT)
Dept: FAMILY MEDICINE | Facility: CLINIC | Age: 81
End: 2021-01-20

## 2021-01-20 ENCOUNTER — OFFICE VISIT - HEALTHEAST (OUTPATIENT)
Dept: INTERNAL MEDICINE | Facility: CLINIC | Age: 81
End: 2021-01-20

## 2021-01-20 DIAGNOSIS — F32.A DEPRESSION, UNSPECIFIED DEPRESSION TYPE: ICD-10-CM

## 2021-01-20 DIAGNOSIS — G47.00 INSOMNIA, UNSPECIFIED TYPE: ICD-10-CM

## 2021-01-20 LAB — TSH SERPL DL<=0.005 MIU/L-ACNC: 1.27 UIU/ML (ref 0.3–5)

## 2021-01-20 ASSESSMENT — MIFFLIN-ST. JEOR: SCORE: 1152.15

## 2021-01-20 ASSESSMENT — ANXIETY QUESTIONNAIRES
3. WORRYING TOO MUCH ABOUT DIFFERENT THINGS: MORE THAN HALF THE DAYS
GAD7 TOTAL SCORE: 7
4. TROUBLE RELAXING: SEVERAL DAYS
7. FEELING AFRAID AS IF SOMETHING AWFUL MIGHT HAPPEN: NOT AT ALL
5. BEING SO RESTLESS THAT IT IS HARD TO SIT STILL: NOT AT ALL
6. BECOMING EASILY ANNOYED OR IRRITABLE: NOT AT ALL
1. FEELING NERVOUS, ANXIOUS, OR ON EDGE: NEARLY EVERY DAY
2. NOT BEING ABLE TO STOP OR CONTROL WORRYING: SEVERAL DAYS

## 2021-01-22 ENCOUNTER — COMMUNICATION - HEALTHEAST (OUTPATIENT)
Dept: ADMINISTRATIVE | Facility: CLINIC | Age: 81
End: 2021-01-22

## 2021-01-25 ENCOUNTER — AMBULATORY - HEALTHEAST (OUTPATIENT)
Dept: INTERNAL MEDICINE | Facility: CLINIC | Age: 81
End: 2021-01-25

## 2021-01-25 DIAGNOSIS — G47.00 INSOMNIA, UNSPECIFIED TYPE: ICD-10-CM

## 2021-01-26 ENCOUNTER — COMMUNICATION - HEALTHEAST (OUTPATIENT)
Dept: FAMILY MEDICINE | Facility: CLINIC | Age: 81
End: 2021-01-26

## 2021-02-03 ENCOUNTER — OFFICE VISIT - HEALTHEAST (OUTPATIENT)
Dept: INTERNAL MEDICINE | Facility: CLINIC | Age: 81
End: 2021-02-03

## 2021-02-03 ENCOUNTER — COMMUNICATION - HEALTHEAST (OUTPATIENT)
Dept: LAB | Facility: CLINIC | Age: 81
End: 2021-02-03

## 2021-02-03 DIAGNOSIS — F32.A DEPRESSION, UNSPECIFIED DEPRESSION TYPE: ICD-10-CM

## 2021-02-03 DIAGNOSIS — R00.2 PALPITATIONS: ICD-10-CM

## 2021-02-03 DIAGNOSIS — D68.4 COAGULATION FACTOR DEFICIENCY SYNDROME (H): ICD-10-CM

## 2021-02-03 DIAGNOSIS — Z86.718 HISTORY OF DVT (DEEP VEIN THROMBOSIS): ICD-10-CM

## 2021-02-03 DIAGNOSIS — I10 BENIGN ESSENTIAL HYPERTENSION: ICD-10-CM

## 2021-02-03 DIAGNOSIS — F41.9 ANXIETY: ICD-10-CM

## 2021-02-03 DIAGNOSIS — C50.911 MALIGNANT NEOPLASM OF RIGHT FEMALE BREAST, UNSPECIFIED ESTROGEN RECEPTOR STATUS, UNSPECIFIED SITE OF BREAST (H): ICD-10-CM

## 2021-02-03 DIAGNOSIS — E78.2 MIXED HYPERLIPIDEMIA: ICD-10-CM

## 2021-02-03 DIAGNOSIS — F51.04 CHRONIC INSOMNIA: ICD-10-CM

## 2021-02-03 ASSESSMENT — ANXIETY QUESTIONNAIRES
4. TROUBLE RELAXING: NEARLY EVERY DAY
GAD7 TOTAL SCORE: 13
2. NOT BEING ABLE TO STOP OR CONTROL WORRYING: MORE THAN HALF THE DAYS
7. FEELING AFRAID AS IF SOMETHING AWFUL MIGHT HAPPEN: SEVERAL DAYS
1. FEELING NERVOUS, ANXIOUS, OR ON EDGE: NEARLY EVERY DAY
5. BEING SO RESTLESS THAT IT IS HARD TO SIT STILL: NOT AT ALL
6. BECOMING EASILY ANNOYED OR IRRITABLE: SEVERAL DAYS
3. WORRYING TOO MUCH ABOUT DIFFERENT THINGS: NEARLY EVERY DAY
IF YOU CHECKED OFF ANY PROBLEMS ON THIS QUESTIONNAIRE, HOW DIFFICULT HAVE THESE PROBLEMS MADE IT FOR YOU TO DO YOUR WORK, TAKE CARE OF THINGS AT HOME, OR GET ALONG WITH OTHER PEOPLE: SOMEWHAT DIFFICULT

## 2021-02-03 ASSESSMENT — MIFFLIN-ST. JEOR: SCORE: 1143.53

## 2021-02-03 ASSESSMENT — PATIENT HEALTH QUESTIONNAIRE - PHQ9: SUM OF ALL RESPONSES TO PHQ QUESTIONS 1-9: 7

## 2021-04-08 ENCOUNTER — OFFICE VISIT - HEALTHEAST (OUTPATIENT)
Dept: FAMILY MEDICINE | Facility: CLINIC | Age: 81
End: 2021-04-08

## 2021-04-08 ENCOUNTER — COMMUNICATION - HEALTHEAST (OUTPATIENT)
Dept: SCHEDULING | Facility: CLINIC | Age: 81
End: 2021-04-08

## 2021-04-08 DIAGNOSIS — F41.9 ANXIETY: ICD-10-CM

## 2021-04-08 DIAGNOSIS — I10 BENIGN ESSENTIAL HYPERTENSION: ICD-10-CM

## 2021-04-08 DIAGNOSIS — F33.9 EPISODE OF RECURRENT MAJOR DEPRESSIVE DISORDER, UNSPECIFIED DEPRESSION EPISODE SEVERITY (H): ICD-10-CM

## 2021-04-08 ASSESSMENT — ANXIETY QUESTIONNAIRES
6. BECOMING EASILY ANNOYED OR IRRITABLE: SEVERAL DAYS
IF YOU CHECKED OFF ANY PROBLEMS ON THIS QUESTIONNAIRE, HOW DIFFICULT HAVE THESE PROBLEMS MADE IT FOR YOU TO DO YOUR WORK, TAKE CARE OF THINGS AT HOME, OR GET ALONG WITH OTHER PEOPLE: SOMEWHAT DIFFICULT
7. FEELING AFRAID AS IF SOMETHING AWFUL MIGHT HAPPEN: MORE THAN HALF THE DAYS
2. NOT BEING ABLE TO STOP OR CONTROL WORRYING: NEARLY EVERY DAY
GAD7 TOTAL SCORE: 14
5. BEING SO RESTLESS THAT IT IS HARD TO SIT STILL: NOT AT ALL
4. TROUBLE RELAXING: MORE THAN HALF THE DAYS
1. FEELING NERVOUS, ANXIOUS, OR ON EDGE: NEARLY EVERY DAY
3. WORRYING TOO MUCH ABOUT DIFFERENT THINGS: NEARLY EVERY DAY

## 2021-04-08 ASSESSMENT — PATIENT HEALTH QUESTIONNAIRE - PHQ9: SUM OF ALL RESPONSES TO PHQ QUESTIONS 1-9: 6

## 2021-04-11 ENCOUNTER — COMMUNICATION - HEALTHEAST (OUTPATIENT)
Dept: INTERNAL MEDICINE | Facility: CLINIC | Age: 81
End: 2021-04-11

## 2021-04-11 DIAGNOSIS — F41.9 ANXIETY: ICD-10-CM

## 2021-04-12 ENCOUNTER — OFFICE VISIT - HEALTHEAST (OUTPATIENT)
Dept: INTERNAL MEDICINE | Facility: CLINIC | Age: 81
End: 2021-04-12

## 2021-04-12 DIAGNOSIS — F41.9 ANXIETY: ICD-10-CM

## 2021-04-12 DIAGNOSIS — I10 BENIGN ESSENTIAL HYPERTENSION: ICD-10-CM

## 2021-04-12 DIAGNOSIS — F32.A DEPRESSION, UNSPECIFIED DEPRESSION TYPE: ICD-10-CM

## 2021-04-12 ASSESSMENT — ANXIETY QUESTIONNAIRES
3. WORRYING TOO MUCH ABOUT DIFFERENT THINGS: NEARLY EVERY DAY
4. TROUBLE RELAXING: MORE THAN HALF THE DAYS
6. BECOMING EASILY ANNOYED OR IRRITABLE: SEVERAL DAYS
5. BEING SO RESTLESS THAT IT IS HARD TO SIT STILL: NOT AT ALL
1. FEELING NERVOUS, ANXIOUS, OR ON EDGE: NEARLY EVERY DAY
2. NOT BEING ABLE TO STOP OR CONTROL WORRYING: NEARLY EVERY DAY
GAD7 TOTAL SCORE: 15
7. FEELING AFRAID AS IF SOMETHING AWFUL MIGHT HAPPEN: NEARLY EVERY DAY

## 2021-04-12 ASSESSMENT — PATIENT HEALTH QUESTIONNAIRE - PHQ9: SUM OF ALL RESPONSES TO PHQ QUESTIONS 1-9: 11

## 2021-04-13 ENCOUNTER — COMMUNICATION - HEALTHEAST (OUTPATIENT)
Dept: INTERNAL MEDICINE | Facility: CLINIC | Age: 81
End: 2021-04-13

## 2021-04-13 DIAGNOSIS — F41.9 ANXIETY: ICD-10-CM

## 2021-04-14 ENCOUNTER — COMMUNICATION - HEALTHEAST (OUTPATIENT)
Dept: ADMINISTRATIVE | Facility: CLINIC | Age: 81
End: 2021-04-14

## 2021-04-19 ENCOUNTER — COMMUNICATION - HEALTHEAST (OUTPATIENT)
Dept: SCHEDULING | Facility: CLINIC | Age: 81
End: 2021-04-19

## 2021-04-19 ENCOUNTER — COMMUNICATION - HEALTHEAST (OUTPATIENT)
Dept: ADMINISTRATIVE | Facility: CLINIC | Age: 81
End: 2021-04-19

## 2021-04-20 ENCOUNTER — COMMUNICATION - HEALTHEAST (OUTPATIENT)
Dept: ADMINISTRATIVE | Facility: CLINIC | Age: 81
End: 2021-04-20

## 2021-04-26 ENCOUNTER — COMMUNICATION - HEALTHEAST (OUTPATIENT)
Dept: INTERNAL MEDICINE | Facility: CLINIC | Age: 81
End: 2021-04-26

## 2021-04-26 DIAGNOSIS — I10 BENIGN ESSENTIAL HYPERTENSION: ICD-10-CM

## 2021-04-28 ENCOUNTER — OFFICE VISIT - HEALTHEAST (OUTPATIENT)
Dept: INTERNAL MEDICINE | Facility: CLINIC | Age: 81
End: 2021-04-28

## 2021-04-28 DIAGNOSIS — R01.1 HEART MURMUR: ICD-10-CM

## 2021-04-28 DIAGNOSIS — R06.02 SHORTNESS OF BREATH: ICD-10-CM

## 2021-04-28 DIAGNOSIS — R00.2 PALPITATIONS: ICD-10-CM

## 2021-04-28 DIAGNOSIS — E78.2 MIXED HYPERLIPIDEMIA: ICD-10-CM

## 2021-04-28 DIAGNOSIS — F32.A DEPRESSION, UNSPECIFIED DEPRESSION TYPE: ICD-10-CM

## 2021-04-28 DIAGNOSIS — F41.9 ANXIETY: ICD-10-CM

## 2021-04-28 DIAGNOSIS — I10 BENIGN ESSENTIAL HYPERTENSION: ICD-10-CM

## 2021-04-28 LAB
ATRIAL RATE - MUSE: 64 BPM
DIASTOLIC BLOOD PRESSURE - MUSE: NORMAL
INTERPRETATION ECG - MUSE: NORMAL
P AXIS - MUSE: 60 DEGREES
PR INTERVAL - MUSE: 172 MS
QRS DURATION - MUSE: 90 MS
QT - MUSE: 424 MS
QTC - MUSE: 437 MS
R AXIS - MUSE: -38 DEGREES
SYSTOLIC BLOOD PRESSURE - MUSE: NORMAL
T AXIS - MUSE: 224 DEGREES
VENTRICULAR RATE- MUSE: 64 BPM

## 2021-04-28 ASSESSMENT — ANXIETY QUESTIONNAIRES
GAD7 TOTAL SCORE: 14
4. TROUBLE RELAXING: MORE THAN HALF THE DAYS
7. FEELING AFRAID AS IF SOMETHING AWFUL MIGHT HAPPEN: MORE THAN HALF THE DAYS
2. NOT BEING ABLE TO STOP OR CONTROL WORRYING: NEARLY EVERY DAY
6. BECOMING EASILY ANNOYED OR IRRITABLE: SEVERAL DAYS
1. FEELING NERVOUS, ANXIOUS, OR ON EDGE: NEARLY EVERY DAY
3. WORRYING TOO MUCH ABOUT DIFFERENT THINGS: NEARLY EVERY DAY
5. BEING SO RESTLESS THAT IT IS HARD TO SIT STILL: NOT AT ALL

## 2021-04-28 ASSESSMENT — PATIENT HEALTH QUESTIONNAIRE - PHQ9: SUM OF ALL RESPONSES TO PHQ QUESTIONS 1-9: 2

## 2021-04-29 ENCOUNTER — COMMUNICATION - HEALTHEAST (OUTPATIENT)
Dept: INTERNAL MEDICINE | Facility: CLINIC | Age: 81
End: 2021-04-29

## 2021-05-11 ENCOUNTER — RECORDS - HEALTHEAST (OUTPATIENT)
Dept: ADMINISTRATIVE | Facility: OTHER | Age: 81
End: 2021-05-11

## 2021-05-11 ENCOUNTER — RECORDS - HEALTHEAST (OUTPATIENT)
Dept: CARDIOLOGY | Facility: CLINIC | Age: 81
End: 2021-05-11

## 2021-05-17 ENCOUNTER — RECORDS - HEALTHEAST (OUTPATIENT)
Dept: ADMINISTRATIVE | Facility: OTHER | Age: 81
End: 2021-05-17

## 2021-05-26 ASSESSMENT — PATIENT HEALTH QUESTIONNAIRE - PHQ9: SUM OF ALL RESPONSES TO PHQ QUESTIONS 1-9: 5

## 2021-05-27 ASSESSMENT — PATIENT HEALTH QUESTIONNAIRE - PHQ9
SUM OF ALL RESPONSES TO PHQ QUESTIONS 1-9: 6
SUM OF ALL RESPONSES TO PHQ QUESTIONS 1-9: 2
SUM OF ALL RESPONSES TO PHQ QUESTIONS 1-9: 7
SUM OF ALL RESPONSES TO PHQ QUESTIONS 1-9: 11

## 2021-05-28 ASSESSMENT — ANXIETY QUESTIONNAIRES
GAD7 TOTAL SCORE: 15
GAD7 TOTAL SCORE: 17
GAD7 TOTAL SCORE: 13
GAD7 TOTAL SCORE: 7
GAD7 TOTAL SCORE: 14
GAD7 TOTAL SCORE: 14

## 2021-05-29 NOTE — TELEPHONE ENCOUNTER
Dr. Marcelo,  Would you like warfarin 4 mg and 1 mg refilled?  Those are the prescriptions that Dr. Hardy refilled on 3/11/19.  Left message for the patient to call back and let us know her current warfarin dose.  Please advise.  Thank you.  Mara PERRY CMA/FLORENCIO....................10:55 AM

## 2021-05-29 NOTE — TELEPHONE ENCOUNTER
Refill Approved    Rx renewed per Medication Renewal Policy. Medication was last renewed on 9/27/18.    Alis Hazel, [facility] Triage/Med Refill 6/18/2019     Requested Prescriptions   Pending Prescriptions Disp Refills     atorvastatin (LIPITOR) 40 MG tablet [Pharmacy Med Name: Atorvastatin Calcium Oral Tablet 40 MG] 90 tablet 0     Sig: TAKE 1 TABLET (40 MG) BY MOUTH ONCE A DAY       Statins Refill Protocol (Hmg CoA Reductase Inhibitors) Passed - 6/17/2019 10:39 AM        Passed - PCP or prescribing provider visit in past 12 months      Last office visit with prescriber/PCP: 7/18/2018 Saad Marcelo MD OR same dept: 7/18/2018 Saad Marcelo MD OR same specialty: 7/18/2018 Saad Marcelo MD  Last physical: 10/22/2018 Last MTM visit: Visit date not found   Next visit within 3 mo: Visit date not found  Next physical within 3 mo: Visit date not found  Prescriber OR PCP: Saad Marcelo MD  Last diagnosis associated with med order: 1. Mixed hyperlipidemia  - atorvastatin (LIPITOR) 40 MG tablet [Pharmacy Med Name: Atorvastatin Calcium Oral Tablet 40 MG]; TAKE 1 TABLET (40 MG) BY MOUTH ONCE A DAY  Dispense: 90 tablet; Refill: 0    2. HTN (hypertension)  - atenolol (TENORMIN) 25 MG tablet [Pharmacy Med Name: Atenolol Oral Tablet 25 MG]; TAKE 1 TABLET (25 MG) BY MOUTH ONCE A DAY  Dispense: 90 tablet; Refill: 0    If protocol passes may refill for 12 months if within 3 months of last provider visit (or a total of 15 months).             atenolol (TENORMIN) 25 MG tablet [Pharmacy Med Name: Atenolol Oral Tablet 25 MG] 90 tablet 0     Sig: TAKE 1 TABLET (25 MG) BY MOUTH ONCE A DAY       Beta-Blockers Refill Protocol Passed - 6/17/2019 10:39 AM        Passed - PCP or prescribing provider visit in past 12 months or next 3 months     Last office visit with prescriber/PCP: 7/18/2018 Saad Marcelo MD OR same dept: 7/18/2018 Saad Marcelo MD OR same specialty: 7/18/2018 Saad Marcelo MD  Last  physical: 10/22/2018 Last MTM visit: Visit date not found   Next visit within 3 mo: Visit date not found  Next physical within 3 mo: Visit date not found  Prescriber OR PCP: Saad Marcelo MD  Last diagnosis associated with med order: 1. Mixed hyperlipidemia  - atorvastatin (LIPITOR) 40 MG tablet [Pharmacy Med Name: Atorvastatin Calcium Oral Tablet 40 MG]; TAKE 1 TABLET (40 MG) BY MOUTH ONCE A DAY  Dispense: 90 tablet; Refill: 0    2. HTN (hypertension)  - atenolol (TENORMIN) 25 MG tablet [Pharmacy Med Name: Atenolol Oral Tablet 25 MG]; TAKE 1 TABLET (25 MG) BY MOUTH ONCE A DAY  Dispense: 90 tablet; Refill: 0    If protocol passes may refill for 12 months if within 3 months of last provider visit (or a total of 15 months).             Passed - Blood pressure filed in past 12 months     BP Readings from Last 1 Encounters:   10/22/18 126/78

## 2021-05-29 NOTE — TELEPHONE ENCOUNTER
Who is calling:  Patient     Reason for Call:  Calling to state : She misplaced her medical card and wanted to know if the clinic had it . Patient also wanted to FYI PCP that she is having surgery at [facility] and will be having a Pre-Op at [facility] hat is closer to her home because of ambulation / transportation issues.   Please advise if Medical Card is Located.  Thank You     Date of last appointment with primary care: 10/2/18    Okay to leave a detailed message: Yes

## 2021-05-29 NOTE — TELEPHONE ENCOUNTER
Medication Question or Clarification  Who is calling: [facility]  What medication are you calling about?   warfarin (COUMADIN/JANTOVEN) 1 MG tablet 90 tablet 1 3/11/2019     Si mg by mouth daily. Adjust dose based on INR results as directed.    Sent to pharmacy as: warfarin (COUMADIN/JANTOVEN) 1 MG tablet    E-Prescribing Status: Receipt confirmed by pharmacy (3/11/2019 12:00 PM CDT)        Who prescribed the medication?: Saad Marcelo MD    What is your question/concern?: Fax received from pharmacy requesting clarification for the above medication. The pharmacy states that the patient has informed them that she is currently taking 6 tablets today daily.     Please advise and send updated RX as needed  [facility]  Okay to leave a detailed message?: No-speak to pharmacy staff  Site CMT - Please call the pharmacy to obtain any additional needed information.

## 2021-05-29 NOTE — TELEPHONE ENCOUNTER
Spoke with Dr. Marcelo, who gave verbal okay for orders requested below.      Left detailed message for Alee with [facility], relaying message from Dr. Marcelo in regards to the orders requested.  Asked that she call back if she has any further questions.  Mara PERRY CMA/FLORENCIO....................11:16 AM

## 2021-05-29 NOTE — TELEPHONE ENCOUNTER
Check to see if TCU would accept her as an outpatient and not after being in the hospital.  I suspect there might be a large noncovered bill, as she had not been hospitalized first.  Insurance may require 3 days in the hospital before they cover TCU.  Please check on that for me and I will be happy to call her.

## 2021-05-29 NOTE — TELEPHONE ENCOUNTER
Who is calling:  Patient.  Reason for Call:  States she had a fall on 5/27/2019 and broke many bones in her right ankle. Did have emergency surgery that day.    States she can not get to the [facility] clinic to see provider due to her condition, so refused office visit.    Has been with family and has Home Health Care from [facility], but her family is unable to stay with her after this week is done.    Patient would like to go to a TCU if possible to heal her ankle, but would like to get Dr Marcelo's advise on what she should do.  Patient is specifically requesting to speak with Dr Marcelo on this.  Date of last appointment with primary care: 10/22/2018  Okay to leave a detailed message: Yes

## 2021-05-29 NOTE — TELEPHONE ENCOUNTER
Patient Returning Call  Reason for call:  Daughter called.  Information relayed to patient:  n/a  Patient has additional questions:  Yes  If YES, what are your questions/concerns:  Just wanted to give Dr Marcelo the phone number of Kerline at the [facility]: [phone] office number and  [phone] Main office number per daughter.  Okay to leave a detailed message?: No call back needed

## 2021-05-29 NOTE — TELEPHONE ENCOUNTER
Spoke with the  and they state they do not have the patient's insurance card.    Spoke with the patient and relayed message from the  staff.  Patient verbalized understanding and had no further questions at this time.  Mara PERRY CMA/FLORENCIO....................12:46 PM

## 2021-05-29 NOTE — TELEPHONE ENCOUNTER
Spoke with care coordinator and confirmed that patient would have to return to ER and admitted to the hospital for 72 hours before Medicare would pay for TCU stay. We are unable to admit her from clinic this needs to be done through the hospital /care team.  Melissa Moreno CSS

## 2021-05-29 NOTE — TELEPHONE ENCOUNTER
I did call the assisted living place and month to meet I.  I have filled out a form for her admission.  Probably take place next Friday or Monday.

## 2021-05-29 NOTE — TELEPHONE ENCOUNTER
Who is calling:  patient  Reason for Call:  Calling to check on below request. Please advise and call patient.  Date of last appointment with primary care: 10/22/18  Okay to leave a detailed message: Yes

## 2021-05-29 NOTE — TELEPHONE ENCOUNTER
Left detailed message for patient to call back and verify how she wants her coumadin sent in. Does she want to continue 1mg tabs or have 4mg and 1mg tabs called in so she will take less pills at a time?

## 2021-05-29 NOTE — TELEPHONE ENCOUNTER
Who is calling:  Fabi  Reason for Call:  Checking on previous request, asking that Dr. Marcelo call her today.  Date of last appointment with primary care: 10/22/18  Okay to leave a detailed message: Yes

## 2021-05-29 NOTE — TELEPHONE ENCOUNTER
I did call this patient.  She has a significant fracture has been operated on.  She is walking with a walker and is toe-touch only without bearing weight on the surgical site.  She feels she needs to be in a TCU like setting as she is by herself.  The opposite leg has some issues with knee ligaments and she keeps having trouble hopping on that leg.  Sounds like she will need some type of assisted living for 2 to 4 weeks.  I will wait for a call from Kerline at [facility].

## 2021-05-30 VITALS — WEIGHT: 157 LBS | HEIGHT: 64 IN | BODY MASS INDEX: 26.8 KG/M2

## 2021-05-30 NOTE — TELEPHONE ENCOUNTER
Timmy Truong for orders requested below?  Please advise.  Thank you.  Mara PERRY, TYESHA/FLORENCIO....................1:17 PM

## 2021-05-30 NOTE — TELEPHONE ENCOUNTER
If this happens again she definitely should go to the emergency room.  It might also be a good idea for her to see Dr. Marcelo next week.

## 2021-05-30 NOTE — PROGRESS NOTES
OFFICE VISIT NOTE    Subjective:   Chief Complaint:  Irregular Heart Beat (was going to bed at 11:30 and heart went irregular, pounding/skipping X 3 hours last Wednesday. nothing since)    79-year-old woman with a past history of hypertension, hyperlipidemia, depression, is now in with complaint of having 3 hours of rapid palpitations last Wednesday.  Started when she went to bed and was lying on her right side.  The lasted about 3 hours.  Some minor shortness of breath.  No syncope no chest pains.  No recurrence since then.  She stopped taking atenolol and atorvastatin at that time, figuring and they were contributing to the heart problem    Current Outpatient Medications   Medication Sig     atenolol (TENORMIN) 25 MG tablet TAKE 1 TABLET (25 MG) BY MOUTH ONCE A DAY     atorvastatin (LIPITOR) 40 MG tablet TAKE 1 TABLET (40 MG) BY MOUTH ONCE A DAY     hydroCHLOROthiazide (HYDRODIURIL) 25 MG tablet TAKE 1 TABLET BY MOUTH DAILY     loperamide (IMODIUM) 2 mg capsule Take 2 mg by mouth as needed.      losartan (COZAAR) 100 MG tablet TAKE 1 TABLET BY MOUTH DAILY     SACCHAROMYCES BOULARDII (PROBIOTIC, S.BOULARDII, ORAL) Take by mouth.     warfarin (COUMADIN/JANTOVEN) 1 MG tablet 5 mg by mouth daily. Adjust dose based on INR results as directed.     warfarin (COUMADIN/JANTOVEN) 4 MG tablet 5 mg daily Adjust dose based on INR results as directed.       PSFHx: Tobacco Status:  She  reports that she quit smoking about 34 years ago. Her smoking use included cigarettes. She has a 15.00 pack-year smoking history. She has never used smokeless tobacco.    Review of Systems:  A comprehensive review of systems is negative except for the comments above    Objective:    Pulse 61   LMP  (LMP Unknown)   SpO2 97%   GENERAL: No acute distress.  Weight is stable.  She is wearing a right foot cast from recent trimalleolar ankle fracture.  Blood pressure today is 118/76.  Pulse today is 61 oxygen saturation is 97%.  No jaundice or  cyanosis.  No edema.  No JVD.  Heart today shows a sinus rhythm pulse is 60 no ectopic beats noted.  No significant murmurs heard    Assessment & Plan   Fabi Smith is a 79 y.o. female.    Palpitations last week.  This could have been 3 hours of atrial fibrillation as the heart rate was quite fast.  We will check a TSH as well as electrolytes and magnesium.  I told her to restart atenolol.  She had an echocardiogram last year which was normal.  If there is any recurrence of palpitations, I will arrange for a cardiac monitor to be worn for 2 weeks.  Return to clinic in 4 weeks for blood pressure check and pulse check.    Diagnoses and all orders for this visit:    Benign essential hypertension  -     Basic Metabolic Panel    Chronic deep vein thrombosis (DVT) of proximal vein of lower extremity, unspecified laterality (H)  -     INR    Palpitations  -     Electrocardiogram Perform and Read  -     Thyroid Cascade  -     Magnesium    Dyslipidemia  -     Lipid Cascade        The following high BMI interventions were performed this visit: encouragement to exercise    Saad Marcelo MD  Transcription using voice recognition software, may contain typographical errors.

## 2021-05-30 NOTE — TELEPHONE ENCOUNTER
Spoke with Hodan from [facility] and relayed message below from Dr. Marcelo.  She verbalized understanding and had no further questions at this time.  Mara PERRY, TYESHA/FLORENCIO....................10:24 AM

## 2021-05-30 NOTE — TELEPHONE ENCOUNTER
Orders being requested: deena from [facility]   Reason service is needed/diagnosis: non weight barring lower right extremity,  continue home health aid services for bathing and personal needs. 2x a week for 3 weeks .  When are orders needed by: as soon as possible   Where to send Orders: Phone:  [phone]  Okay to leave detailed message?  Yes

## 2021-05-30 NOTE — TELEPHONE ENCOUNTER
Orders being requested: Discharge from Physical Therapy.  Reason service is needed/diagnosis: She will be going to outpatient.  When are orders needed by: n/a  Where to send Orders: n/a  Okay to leave detailed message?  Yes

## 2021-05-30 NOTE — TELEPHONE ENCOUNTER
Triage call:   Living In [place] temporally - doing all her exercises.   About 11:30 pm last night she experienced an irregular HR- fast and irregular- it was difficult for her to take her pulse but was able to walk when it happened.  Denies symptoms other than feeling very anxious. Had large voids within one hour and 15 minutes. Patient states that this episode lasted for 3 hour and then went back to normal. Not having irregular HR now- notes that it happens when going from sitting to laying down. Was laying on her right side to go to sleep when episode occurred.    Patient called a care attendant at her facility when it happened- Pulse was 95 during episode- taken by Care attendant.  /91- taken by care attendant. Care attendant called on call nurse and was advised to call an ambulance - patient did not do so and chose to wait it out.     Patient reports that she had Similar symptoms about 1.5 years ago and had a cardiac work out at that time. Hx of PVC    *Ok to leave a detailed message upon call back- Patient states that she is scheduled to go home on Saturday- call cell phone listed in encounter     Patient is requesting provider advice- should this happen again patient is wondering what she should do. Should she call an ambulance? Advised that she can also call nurse triage line as well for advice when she has the symptoms if she would like. PCP please advise.     Noni Herrera RN BSBA [facility] Triage/Med Refill 7/25/2019 1:31 PM    Reason for Disposition    Nursing judgment    Protocols used: NO PROTOCOL AVAILABLE - INFORMATION ONLY-A-[place]

## 2021-05-31 VITALS — BODY MASS INDEX: 26.31 KG/M2 | HEIGHT: 64 IN | WEIGHT: 154.12 LBS

## 2021-05-31 VITALS — BODY MASS INDEX: 26.29 KG/M2 | HEIGHT: 64 IN | WEIGHT: 154 LBS

## 2021-05-31 VITALS — WEIGHT: 152 LBS | BODY MASS INDEX: 25.95 KG/M2 | HEIGHT: 64 IN

## 2021-05-31 NOTE — TELEPHONE ENCOUNTER
RN cannot approve Refill Request    RN can NOT refill this medication med is not covered by policy/route to provider. Last office visit: 8/28/2019 Saad Marcelo MD Last Physical: 10/22/2018 Last MTM visit: Visit date not found Last visit same specialty: 8/28/2019 Saad Marcelo MD.  Next visit within 3 mo: Visit date not found  Next physical within 3 mo: Visit date not found      Kelsey Muñoz, [facility] Triage/Med Refill 8/29/2019    Requested Prescriptions   Pending Prescriptions Disp Refills     warfarin (COUMADIN/JANTOVEN) 1 MG tablet [Pharmacy Med Name: Warfarin Sodium Oral Tablet 1 MG] 90 tablet 0     Sig: TAKE 1 TABLET (1 MG) BY MOUTH ONCE DAILY. TAKE WITH 4 MG TABLET FOR TOTAL DAILY DOSE OF 5MG. ADJUST DOSE BASED ON INR RESULTS AS DIRECTED.       Warfarin Refill Protocol  Failed - 8/29/2019  3:16 PM        Failed -  Route to appropriate pool/provider     Last Anticoagulation Summary:           Passed - Provider visit in last year     Last office visit with prescriber/PCP: 8/28/2019 Saad Marcelo MD OR same dept: 8/28/2019 Saad Marcelo MD OR same specialty: 8/28/2019 Saad Marcelo MD  Last physical: 10/22/2018 Last MTM visit: Visit date not found    Next appt within 3 mo: Visit date not found Next physical within 3 mo: Visit date not found  Prescriber OR PCP: Saad Marcelo MD  Last diagnosis associated with med order: There are no diagnoses linked to this encounter.  If protocol passes may refill for 6 months if within 3 months of last provider visit (or a total of 9 months).               This medication has been routed to Dr Marcelo.

## 2021-05-31 NOTE — PROGRESS NOTES
"OFFICE VISIT NOTE    Subjective:   Chief Complaint:  Follow-up (pt states that she had a blood clot in her Right lower leg-pt is also here to follow up on her irregular heartbeat)    79-year-old woman in for follow-up regarding recent surgery on the ankle complicated by small deep vein thrombophlebitis of the lower leg.  She has been on term and a follow-up ultrasound 1 week later still shows a 9 occlusive lower right leg.  Is been no significant swelling of the leg.  There is been no pleurisy or dyspnea.  No hemoptysis.  She takes her warfarin daily and has been therapeutic range.    Current Outpatient Medications   Medication Sig     ALPRAZolam (XANAX) 0.25 MG tablet 1 tab p.o. twice daily as needed     atenolol (TENORMIN) 25 MG tablet TAKE 1 TABLET (25 MG) BY MOUTH ONCE A DAY     atorvastatin (LIPITOR) 40 MG tablet TAKE 1 TABLET (40 MG) BY MOUTH ONCE A DAY     hydroCHLOROthiazide (HYDRODIURIL) 25 MG tablet TAKE 1 TABLET BY MOUTH DAILY     loperamide (IMODIUM) 2 mg capsule Take 2 mg by mouth as needed.      losartan (COZAAR) 100 MG tablet TAKE 1 TABLET BY MOUTH DAILY     SACCHAROMYCES BOULARDII (PROBIOTIC, S.BOULARDII, ORAL) Take by mouth.     warfarin (COUMADIN/JANTOVEN) 1 MG tablet 5 mg by mouth daily. Adjust dose based on INR results as directed.     warfarin (COUMADIN/JANTOVEN) 4 MG tablet TAKE 1 TABLET (4 MG) BY MOUTH ONCE DAILY. TAKE WITH 1 MG TABLET FOR TOTAL DAILY DOSE OF 5 MG. ADJUST DOSE BASED ON INR RESULTS AS DIRECTED.       PSFHx: Tobacco Status:  She  reports that she quit smoking about 34 years ago. Her smoking use included cigarettes. She has a 15.00 pack-year smoking history. She has never used smokeless tobacco.    Review of Systems:  A comprehensive review of systems is negative except for the comments above    Objective:    /72 (Patient Site: Left Arm, Patient Position: Sitting, Cuff Size: Adult Regular)   Pulse 61   Ht 5' 4\" (1.626 m)   Wt 153 lb (69.4 kg)   LMP  (LMP Unknown)   " SpO2 97% Comment: RA  BMI 26.26 kg/m    GENERAL: No acute distress.  She looks okay.  She is rather nervous and anxious and admits to this.  She would like to get back on Prozac.  Xanax has been used as needed but she realizes she cannot use her arm.  Pedal pulses are normal.  Calf does not show swelling of any significance.  There is no cellulitis.  No evidence of active phlebitis or cellulitis.  Lungs are clear.  Heart today shows a pulse of 66 which is perfectly regular.  No ectopic beats.  No evidence of atrial fibrillation.    Assessment & Plan   Fabi Smith is a 79 y.o. female.    She is stable.  I think we will start her back on Prozac since she has so much anxiety some history of depression.  Prozac worked well for her in the past.  She will continue warfarin.  Activities at this point are unrestricted.  Does follow orthopedics.  She could use atenolol as needed for rapid irregular heartbeat.  I wonder if she might have had atrial fibrillation in the recent past she was aware of a rapid heartbeat for about 3 hours.  It was irregular.  Diagnoses and all orders for this visit:    Chronic deep vein thrombosis (DVT) of proximal vein of lower extremity, unspecified laterality (H)    Coagulation factor deficiency syndrome (H)    Benign essential hypertension            Saad Marcelo MD  Transcription using voice recognition software, may contain typographical errors.

## 2021-05-31 NOTE — TELEPHONE ENCOUNTER
The minor changes seen on EKG are nothing to be worried about.  They can be caused by blood pressure, medication, electrolytes,etc. there are probably over 50 things that can cause minor changes.  Again, these have been present in the past.  Nothing serious at all.

## 2021-05-31 NOTE — TELEPHONE ENCOUNTER
Dr. Marcelo,  Spoke with the patient and relayed message below.  She verbalized understanding, but would like to know what kind of minor, nonspecific changes were noticed.  Patient would also like to know if it is something that she should be concerned about.  Please advise.  Thank you.  Mara PERRY CMA/FLORENCIO....................4:59 PM

## 2021-05-31 NOTE — TELEPHONE ENCOUNTER
FYI - Status Update  Who is Calling: Patient  Update: Patient is calling to inform Saad Marcelo MD she developed a blood clot in right lower leg post ankle fx and she is working with her hematologist Dr. Sadi Busch [phone].  She is also due for a follow up on the irregular heart rate and will be scheduling this today.   Okay to leave a detailed message?:  Yes

## 2021-05-31 NOTE — TELEPHONE ENCOUNTER
Test Results  Who is calling?:  Patient  Who ordered the test:  Saad Marcelo MD    Type of test: Other: EKG  Date of test:  7/29/19  Where was the test performed:  clinic  What are your questions/concerns?:  Would like the final interpretation of EKG, would also like a copy mailed to her home address..  Patient advised that lab results have been mailed to her home address, went over these labs with patient.  Patient expressed understanding.  Okay  to leave a detailed message?:  Yes

## 2021-05-31 NOTE — TELEPHONE ENCOUNTER
RN sent Rx refill to anticoagulation pool for review.  Mendy Fernandez RN, [facility] Med Refill/Triage, 8/25/2019 4:21 PM

## 2021-06-01 VITALS — WEIGHT: 155 LBS | BODY MASS INDEX: 26.46 KG/M2 | HEIGHT: 64 IN

## 2021-06-01 NOTE — PROGRESS NOTES
Office Visit - Follow up    Fabi Smith   79 y.o. female    Date of Visit: 9/19/2019    Chief Complaint   Patient presents with     Anxiety     Insomnia     sleeps 2-3 hours MAX       Subjective: Very pleasant patient of Dr. Saad Marcelo here with concerns regarding insomnia and moderate anxiety.    Patient has been under great deal of stress recently and over the last several nights has been unable to sleep.  She does have a prescription for alprazolam and has 2 pills left.  These are low dose 0.25 mg which he uses at bedtime for sleep    Recently started by Dr. Saad Marcelo on fluoxetine for anxiety symptoms however she feels since starting this her anxiety has been worse.  She denies any suicidal I which is getting only a few hours of sleep nightly at the most.        ROS: A comprehensive review of systems was performed and was otherwise negative except as mentioned above.     Exam  Unremarkable   /80 (Patient Site: Left Arm, Patient Position: Sitting, Cuff Size: Adult Regular)   Pulse (!) 51   Wt 153 lb (69.4 kg)   LMP  (LMP Unknown)   SpO2 97%   Breastfeeding? No   BMI 26.26 kg/m      Assessment and Plan  Chronic insomnia and exacerbation of anxiety.  Possible exacerbation of underlying low-dose fluoxetine.    I had a thorough discussion with her.  I would like to Saad Marcelo to make major decisions regarding her regimen and long-term care.  For now I have refilled low-dose alprazolam which she will continue to use as needed and will have her hold her Prozac for now pending discussion with Dr. Saad Marcelo in the next 2 weeks    Fabi was seen today for anxiety and insomnia.    Diagnoses and all orders for this visit:    Anxiety  -     ALPRAZolam (XANAX) 0.25 MG tablet; 1 tab p.o. twice daily as needed    Primary insomnia          Time: total time spent with the patient was 15 minutes of which >50% was spent in counseling and coordination of care        No Known  Allergies    Medications :  Prior to Admission medications    Medication Sig Start Date End Date Taking? Authorizing Provider   ALPRAZolam (XANAX) 0.25 MG tablet 1 tab p.o. twice daily as needed 9/19/19  Yes Niels Gleason MD   atenolol (TENORMIN) 25 MG tablet TAKE 1 TABLET (25 MG) BY MOUTH ONCE A DAY 6/18/19  Yes Saad Marcelo MD   atorvastatin (LIPITOR) 40 MG tablet TAKE 1 TABLET (40 MG) BY MOUTH ONCE A DAY 6/18/19  Yes Saad Marcelo MD   hydroCHLOROthiazide (HYDRODIURIL) 25 MG tablet TAKE 1 TABLET BY MOUTH DAILY 11/29/18  Yes Saad Marcelo MD   losartan (COZAAR) 100 MG tablet TAKE 1 TABLET BY MOUTH DAILY 11/29/18  Yes Saad Marcelo MD   SACCHAROMYCES BOULARDII (PROBIOTIC, S.BOULARDII, ORAL) Take by mouth.   Yes PROVIDER, HISTORICAL   warfarin (COUMADIN/JANTOVEN) 1 MG tablet TAKE 1 TABLET (1 MG) BY MOUTH ONCE DAILY. TAKE WITH 4 MG TABLET FOR TOTAL DAILY DOSE OF 5MG. ADJUST DOSE BASED ON INR RESULTS AS DIRECTED. 8/30/19  Yes Niles Hardy MD   warfarin (COUMADIN/JANTOVEN) 4 MG tablet TAKE 1 TABLET (4 MG) BY MOUTH ONCE DAILY. TAKE WITH 1 MG TABLET FOR TOTAL DAILY DOSE OF 5 MG. ADJUST DOSE BASED ON INR RESULTS AS DIRECTED. 8/26/19  Yes Saad Marcelo MD   ALPRAZolam (XANAX) 0.25 MG tablet 1 tab p.o. twice daily as needed 7/29/19 9/19/19 Yes Saad Marcelo MD   FLUoxetine (PROZAC) 20 MG capsule Take 1 capsule (20 mg total) by mouth daily. 8/28/19 9/19/19 Yes Saad Marcelo MD   loperamide (IMODIUM) 2 mg capsule Take 2 mg by mouth as needed.     PROVIDER, HISTORICAL        Past Medical History:   Past Medical History:   Diagnosis Date     DVT (deep venous thrombosis) (H)      Hyperlipidemia      PE (pulmonary embolism)        Past Surgical History:   Past Surgical History:   Procedure Laterality Date     APPENDECTOMY       BREAST RECONSTRUCTION       MASTECTOMY  2001    for cancer dx in 1 breast     TONSILLECTOMY AND ADENOIDECTOMY       WRIST GANGLION EXCISION         Social  History:   Social History     Socioeconomic History     Marital status:      Spouse name: Not on file     Number of children: Not on file     Years of education: Not on file     Highest education level: Not on file   Occupational History     Not on file   Social Needs     Financial resource strain: Not on file     Food insecurity:     Worry: Not on file     Inability: Not on file     Transportation needs:     Medical: Not on file     Non-medical: Not on file   Tobacco Use     Smoking status: Former Smoker     Packs/day: 0.50     Years: 30.00     Pack years: 15.00     Types: Cigarettes     Last attempt to quit: 1985     Years since quittin.6     Smokeless tobacco: Never Used     Tobacco comment: quit age 46   Substance and Sexual Activity     Alcohol use: No     Drug use: No     Sexual activity: Never   Lifestyle     Physical activity:     Days per week: Not on file     Minutes per session: Not on file     Stress: Not on file   Relationships     Social connections:     Talks on phone: Not on file     Gets together: Not on file     Attends [employer] service: Not on file     Active member of club or organization: Not on file     Attends meetings of clubs or organizations: Not on file     Relationship status: Not on file     Intimate partner violence:     Fear of current or ex partner: Not on file     Emotionally abused: Not on file     Physically abused: Not on file     Forced sexual activity: Not on file   Other Topics Concern     Not on file   Social History Narrative    Has 4 children, .  had Parkinson's disease. RN retired.       Family History:   Family History   Problem Relation Age of Onset     Uterine cancer Mother         passed     Thyroid cancer Father         passed     No Medical Problems Brother      Clotting disorder Brother          Niels Gleason MD

## 2021-06-01 NOTE — TELEPHONE ENCOUNTER
Who is calling:  Fabi  Reason for Call:  She is checking on status of previous message.  She states she can not drive to come for an appointment today because she so sleep deprived.  She states she has seen Dr. Berry and is asking if he can review this message.  Patient is aware her primary is out the rest of the week.  She is desperate to get something to help her sleep and calm her anxiety.  Please call patient today.  Date of last appointment with primary care: 8/28/19  Okay to leave a detailed message: Yes

## 2021-06-01 NOTE — TELEPHONE ENCOUNTER
Refill Approved    Rx renewed per Medication Renewal Policy. Medication was last renewed on 6/18/19.    Gladis Florez, [facility] Triage/Med Refill 9/27/2019     Requested Prescriptions   Pending Prescriptions Disp Refills     atorvastatin (LIPITOR) 40 MG tablet [Pharmacy Med Name: Atorvastatin Calcium Oral Tablet 40 MG] 90 tablet 0     Sig: TAKE 1 TABLET (40 MG) BY MOUTH ONCE A DAY       Statins Refill Protocol (Hmg CoA Reductase Inhibitors) Passed - 9/27/2019  8:17 AM        Passed - PCP or prescribing provider visit in past 12 months      Last office visit with prescriber/PCP: 8/28/2019 Saad Marcelo MD OR same dept: 9/19/2019 Niels Gleason MD OR same specialty: 9/19/2019 Niels Gleason MD  Last physical: 10/22/2018 Last MTM visit: Visit date not found   Next visit within 3 mo: Visit date not found  Next physical within 3 mo: Visit date not found  Prescriber OR PCP: Saad Marcelo MD  Last diagnosis associated with med order: 1. Mixed hyperlipidemia  - atorvastatin (LIPITOR) 40 MG tablet [Pharmacy Med Name: Atorvastatin Calcium Oral Tablet 40 MG]; TAKE 1 TABLET (40 MG) BY MOUTH ONCE A DAY  Dispense: 90 tablet; Refill: 0    If protocol passes may refill for 12 months if within 3 months of last provider visit (or a total of 15 months).

## 2021-06-01 NOTE — TELEPHONE ENCOUNTER
Patient Returning Call  Reason for call:  Patient called back  Information relayed to patient:  n/a  Patient has additional questions:  Yes  If YES, what are your questions/concerns:  Calling on the status of this message.   Patient states she really needs to speak with Dr Marcelo before he leaves today.  Okay to leave a detailed message?: Yes

## 2021-06-01 NOTE — TELEPHONE ENCOUNTER
Who is calling:  The patient  Reason for Call:  The patient states that she has not been able to sleep for more than 2-3 hours a night for past 3 weeks. The patient states that she has been taking the ALPRAZolam (XANAX) 0.25 MG tablet as prescribed and it's not working. The patient states that she feels alert and denies falling asleep behind the wheel risks.     The patient would like a return call from Saad Marcelo MD today after 11:30 am to discuss on her preferred number of [phone].     Date of last appointment with primary care: 9/19/2019  Okay to leave a detailed message: Yes

## 2021-06-01 NOTE — TELEPHONE ENCOUNTER
Medication Question or Clarification  Who is calling: Patient  What medication are you calling about? (include dose and sig)    Disp Refills Start End    FLUoxetine (PROZAC) 20 MG capsule 90 capsule 2 8/28/2019     Sig - Route: Take 1 capsule (20 mg total) by mouth daily. - Oral    Sent to pharmacy as: FLUoxetine (PROZAC) 20 MG capsule    E-Prescribing Status: Receipt confirmed by pharmacy (8/28/2019 11:29 AM CDT)      Who prescribed the medication?: Saad Marcelo MD   What is your question/concern?: Patient stated she was taking this medication for about 1 week and stopped due to experiencing tremors through out her whole body.  The patient stated she then stopped the medication for a little while and has restarted it about 1.5 weeks ago and has now been experiencing severe anxiety that she is struggling to manage and she has not been able to sleep more than 3 hours at night since restarting this medication.  Patient is questioning if there is something else she can take to help her sleep and to help her anxiety?  Patient stated this level of anxiety is new to her and is very overwhelming.    Patient reported she was on an anxiety medication in the past that she did not tolerate very well, but she cannot recall the name of the medication.    Patient is requesting to speak with Saad Marcelo MD or a provider that is covering before anything new is prescribed.  Patient would like to discuss her options and to ensure there will not be an interaction with her other medications.   Pharmacy: [facility] - [place] ([address])  Okay to leave a detailed message?: Yes  Site CMT - Please call the pharmacy to obtain any additional needed information.

## 2021-06-01 NOTE — TELEPHONE ENCOUNTER
I did call this patient.  Prozac is causing insomnia.  She will discontinue that.  In its place she will take mirtazapine 15 mg at at bedtime for the first week then increase to 30 mg nightly.

## 2021-06-01 NOTE — TELEPHONE ENCOUNTER
"Pt calls again ....  No additional symptoms.  Seeking a response regarding her original message about inability to sleep.  States \"This is a new problem.\"  \"Only sleeping 2-to-3 hours a night.\"  \"Also having new waves of anxiety which strike during the daytime.\"  Correlates with starting Prozac three weeks ago (8/28/19)..    PCP is not in clinic today.  Pt agrees to see a colleague in clinic today to discuss med options.  Warm transferred to a  for this purpose now.    Veronica Harris RN BSBA  [facility] RN Triage     "

## 2021-06-01 NOTE — TELEPHONE ENCOUNTER
Dr. Hardy,  Would you be willing to review the message below and advise a medication that you might be willing to prescribe.  Please advise.  Thank you.  Mara PERRY, TYESHA/CMT....................2:51 PM

## 2021-06-02 VITALS — WEIGHT: 156 LBS | BODY MASS INDEX: 26.63 KG/M2 | HEIGHT: 64 IN

## 2021-06-02 NOTE — PROGRESS NOTES
OFFICE VISIT NOTE    Subjective:   Chief Complaint:  No chief complaint on file.    Follow-up regarding several problems including hypertension, hyperlipidemia, vein thrombophlebitis due to a coagulation factor deficiency, and, recently insomnia.  I did place her on mirtazapine at 15 mg.  Prozac was discontinued.  With this change, her sleep is much better.  She is much less anxious at night.  She tries to be active.  Some pain in the right foot and ankle.  No angina, shortness of breath, TIAs.  She is taking warfarin as a blood thinner.  Complaining of some increased urinary frequency.  No dysuria or hematuria.  Current Outpatient Medications   Medication Sig     ALPRAZolam (XANAX) 0.25 MG tablet 1 tab p.o. twice daily as needed     atenolol (TENORMIN) 25 MG tablet TAKE 1 TABLET (25 MG) BY MOUTH ONCE A DAY     atorvastatin (LIPITOR) 40 MG tablet Take 1 tablet (40 mg total) by mouth daily.     hydroCHLOROthiazide (HYDRODIURIL) 25 MG tablet TAKE 1 TABLET BY MOUTH DAILY     loperamide (IMODIUM) 2 mg capsule Take 2 mg by mouth as needed.      losartan (COZAAR) 100 MG tablet TAKE 1 TABLET BY MOUTH DAILY     mirtazapine (REMERON SOL-TAB) 15 MG disintegrating tablet DISSOLVE 1 TABLET ON THE TONGUE EVERY NIGHT AT BEDTIME FOR 7 NIGHTS, THEN DISSOLVE 2 TABLETS ON THE TONGUE EVERY NIGHT AT BEDTIME     SACCHAROMYCES BOULARDII (PROBIOTIC, S.BOULARDII, ORAL) Take by mouth.     warfarin (COUMADIN/JANTOVEN) 1 MG tablet TAKE 1 TABLET (1 MG) BY MOUTH ONCE DAILY. TAKE WITH 4 MG TABLET FOR TOTAL DAILY DOSE OF 5MG. ADJUST DOSE BASED ON INR RESULTS AS DIRECTED.     warfarin (COUMADIN/JANTOVEN) 4 MG tablet TAKE 1 TABLET (4 MG) BY MOUTH ONCE DAILY. TAKE WITH 1 MG TABLET FOR TOTAL DAILY DOSE OF 5 MG. ADJUST DOSE BASED ON INR RESULTS AS DIRECTED.       PSFHx: Tobacco Status:  She  reports that she quit smoking about 34 years ago. Her smoking use included cigarettes. She has a 15.00 pack-year smoking history. She has never used smokeless  tobacco.    Review of Systems:  A comprehensive review of systems is negative except for the comments above    Objective:    LMP  (LMP Unknown)   GENERAL: No acute distress.  Weight is up 5 pounds.  Blood pressure is 114/78.  Pulse is 60 and regular.  She does have some chronic edema of the lower legs.  I do not see any active phlebitis.  Specifically, no erythema tenderness or warmth.  Pedal pulses seem normal.  Lungs are clear.  Heart shows a sinus rhythm.  There is a grade 2/6 short systolic ejection murmur at the upper left sternal border.  No diastolic murmurs heard.  No JVD or carotid bruits.  She seems happier and less anxious.    Assessment & Plan   Fabi Smith is a 79 y.o. female.    She seems improved.  Mirtazapine seems to control her anxiety she is sleeping better so we will continue on the 15 mg daily.  I checked a urine analysis today and it does not look suspicious for infection.  Her blood pressure meds will remain the same since pressure is at goal.  She continues on warfarin.  I can see her next April or May.    Diagnoses and all orders for this visit:    Urinary frequency  -     Urinalysis-UC if Indicated    Chronic insomnia    Chronic deep vein thrombosis (DVT) of proximal vein of lower extremity, unspecified laterality (H)    Coagulation factor deficiency syndrome (H)        The following high BMI interventions were performed this visit: encouragement to exercise    Saad Marcelo MD  Transcription using voice recognition software, may contain typographical errors.

## 2021-06-03 VITALS
WEIGHT: 153 LBS | BODY MASS INDEX: 26.26 KG/M2 | HEART RATE: 51 BPM | SYSTOLIC BLOOD PRESSURE: 132 MMHG | OXYGEN SATURATION: 97 % | DIASTOLIC BLOOD PRESSURE: 80 MMHG

## 2021-06-03 VITALS
HEART RATE: 60 BPM | WEIGHT: 159 LBS | BODY MASS INDEX: 27.14 KG/M2 | SYSTOLIC BLOOD PRESSURE: 114 MMHG | HEIGHT: 64 IN | DIASTOLIC BLOOD PRESSURE: 78 MMHG

## 2021-06-03 VITALS — WEIGHT: 153 LBS | BODY MASS INDEX: 26.12 KG/M2 | HEIGHT: 64 IN

## 2021-06-03 NOTE — TELEPHONE ENCOUNTER
Refill Approved    Rx renewed per Medication Renewal Policy. Medication was last renewed on 11/29/18.    Alis Hazel, [facility] Triage/Med Refill 11/21/2019     Requested Prescriptions   Pending Prescriptions Disp Refills     losartan (COZAAR) 100 MG tablet [Pharmacy Med Name: Losartan Potassium Oral Tablet 100 MG] 90 tablet 1     Sig: TAKE 1 TABLET (100 MG) BY MOUTH ONCE A DAY       Angiotensin Receptor Blocker Protocol Passed - 11/20/2019  7:05 AM        Passed - PCP or prescribing provider visit in past 12 months       Last office visit with prescriber/PCP: 10/14/2019 Saad Marcelo MD OR same dept: 10/14/2019 Saad Marcelo MD OR same specialty: 10/14/2019 Saad Marcelo MD  Last physical: 10/22/2018 Last MTM visit: Visit date not found   Next visit within 3 mo: Visit date not found  Next physical within 3 mo: Visit date not found  Prescriber OR PCP: Saad Marcelo MD  Last diagnosis associated with med order: 1. HTN (hypertension)  - losartan (COZAAR) 100 MG tablet [Pharmacy Med Name: Losartan Potassium Oral Tablet 100 MG]; TAKE 1 TABLET (100 MG) BY MOUTH ONCE A DAY  Dispense: 90 tablet; Refill: 1    If protocol passes may refill for 12 months if within 3 months of last provider visit (or a total of 15 months).             Passed - Serum potassium within the past 12 months     Lab Results   Component Value Date    Potassium 3.5 07/29/2019             Passed - Blood pressure filed in past 12 months     BP Readings from Last 1 Encounters:   10/14/19 114/78             Passed - Serum creatinine within the past 12 months     Creatinine   Date Value Ref Range Status   07/29/2019 0.75 0.60 - 1.10 mg/dL Final

## 2021-06-03 NOTE — TELEPHONE ENCOUNTER
Refill Approved    Rx renewed per Medication Renewal Policy. Medication was last renewed on 11/29/18.    Alis Hazel, [facility] Triage/Med Refill 11/20/2019     Requested Prescriptions   Pending Prescriptions Disp Refills     hydroCHLOROthiazide (HYDRODIURIL) 25 MG tablet [Pharmacy Med Name: hydroCHLOROthiazide Oral Tablet 25 MG] 90 tablet 1     Sig: TAKE 1 TABLET BY MOUTH ONCE DAILY       Diuretics/Combination Diuretics Refill Protocol  Passed - 11/19/2019 11:41 AM        Passed - Visit with PCP or prescribing provider visit in past 12 months     Last office visit with prescriber/PCP: 10/14/2019 Saad Marcelo MD OR same dept: 10/14/2019 Saad Marcelo MD OR same specialty: 10/14/2019 Saad Marcelo MD  Last physical: 10/22/2018 Last MTM visit: Visit date not found   Next visit within 3 mo: Visit date not found  Next physical within 3 mo: Visit date not found  Prescriber OR PCP: Saad Marcelo MD  Last diagnosis associated with med order: 1. HTN (hypertension)  - hydroCHLOROthiazide (HYDRODIURIL) 25 MG tablet [Pharmacy Med Name: hydroCHLOROthiazide Oral Tablet 25 MG]; TAKE 1 TABLET BY MOUTH ONCE DAILY   Dispense: 90 tablet; Refill: 1    If protocol passes may refill for 12 months if within 3 months of last provider visit (or a total of 15 months).             Passed - Serum Potassium in past 12 months      Lab Results   Component Value Date    Potassium 3.5 07/29/2019             Passed - Serum Sodium in past 12 months      Lab Results   Component Value Date    Sodium 140 07/29/2019             Passed - Blood pressure on file in past 12 months     BP Readings from Last 1 Encounters:   10/14/19 114/78             Passed - Serum Creatinine in past 12 months      Creatinine   Date Value Ref Range Status   07/29/2019 0.75 0.60 - 1.10 mg/dL Final

## 2021-06-05 VITALS
BODY MASS INDEX: 26.8 KG/M2 | OXYGEN SATURATION: 96 % | HEIGHT: 64 IN | DIASTOLIC BLOOD PRESSURE: 82 MMHG | WEIGHT: 157 LBS | SYSTOLIC BLOOD PRESSURE: 126 MMHG

## 2021-06-05 VITALS
WEIGHT: 155 LBS | SYSTOLIC BLOOD PRESSURE: 126 MMHG | HEIGHT: 64 IN | BODY MASS INDEX: 26.46 KG/M2 | DIASTOLIC BLOOD PRESSURE: 80 MMHG | OXYGEN SATURATION: 95 % | HEART RATE: 58 BPM

## 2021-06-05 VITALS
WEIGHT: 157 LBS | SYSTOLIC BLOOD PRESSURE: 150 MMHG | OXYGEN SATURATION: 97 % | HEART RATE: 67 BPM | HEIGHT: 64 IN | DIASTOLIC BLOOD PRESSURE: 68 MMHG | BODY MASS INDEX: 26.8 KG/M2

## 2021-06-05 VITALS
SYSTOLIC BLOOD PRESSURE: 136 MMHG | WEIGHT: 157 LBS | OXYGEN SATURATION: 97 % | BODY MASS INDEX: 27.81 KG/M2 | HEART RATE: 67 BPM | DIASTOLIC BLOOD PRESSURE: 80 MMHG

## 2021-06-05 VITALS
HEIGHT: 63 IN | SYSTOLIC BLOOD PRESSURE: 154 MMHG | DIASTOLIC BLOOD PRESSURE: 70 MMHG | BODY MASS INDEX: 28.1 KG/M2 | WEIGHT: 158.6 LBS

## 2021-06-05 VITALS
BODY MASS INDEX: 28.02 KG/M2 | SYSTOLIC BLOOD PRESSURE: 175 MMHG | DIASTOLIC BLOOD PRESSURE: 87 MMHG | WEIGHT: 158.2 LBS | HEART RATE: 51 BPM | RESPIRATION RATE: 20 BRPM

## 2021-06-05 VITALS
HEIGHT: 64 IN | DIASTOLIC BLOOD PRESSURE: 78 MMHG | WEIGHT: 157 LBS | BODY MASS INDEX: 26.8 KG/M2 | HEART RATE: 76 BPM | SYSTOLIC BLOOD PRESSURE: 124 MMHG

## 2021-06-05 VITALS — BODY MASS INDEX: 28.24 KG/M2 | WEIGHT: 159.4 LBS | DIASTOLIC BLOOD PRESSURE: 80 MMHG | SYSTOLIC BLOOD PRESSURE: 122 MMHG

## 2021-06-06 NOTE — TELEPHONE ENCOUNTER
RN cannot approve Refill Request    RN can NOT refill this medication Protocol failed and NO refill given.       Alis Hazel, [facility] Triage/Med Refill 3/17/2020    Requested Prescriptions   Pending Prescriptions Disp Refills     warfarin ANTICOAGULANT (COUMADIN/JANTOVEN) 1 MG tablet [Pharmacy Med Name: Warfarin Sodium Oral Tablet 1 MG] 90 tablet 0     Sig: TAKE 1 TABLET (1 MG) BY MOUTH ONCE DAILY. TAKE WITH 4 MG TABLET FOR TOTAL DAILY DOSE OF 5MG. ADJUST DOSE BASED ON INR RESULTS AS DIRECTED.       Warfarin Refill Protocol  Failed - 3/17/2020  2:50 PM        Failed -  Route to appropriate pool/provider     Last Anticoagulation Summary:           Passed - Provider visit in last year     Last office visit with prescriber/PCP: 10/14/2019 Saad Marcelo MD OR same dept: 10/14/2019 Saad Marcelo MD OR same specialty: 10/14/2019 Saad Marcelo MD  Last physical: 10/22/2018 Last MTM visit: Visit date not found    Next appt within 3 mo: Visit date not found Next physical within 3 mo: Visit date not found  Prescriber OR PCP: Saad Marcelo MD  Last diagnosis associated with med order: 1. Coagulation factor deficiency syndrome (H)  - warfarin ANTICOAGULANT (COUMADIN/JANTOVEN) 1 MG tablet [Pharmacy Med Name: Warfarin Sodium Oral Tablet 1 MG]; TAKE 1 TABLET (1 MG) BY MOUTH ONCE DAILY. TAKE WITH 4 MG TABLET FOR TOTAL DAILY DOSE OF 5MG. ADJUST DOSE BASED ON INR RESULTS AS DIRECTED.  Dispense: 90 tablet; Refill: 0    If protocol passes may refill for 6 months if within 3 months of last provider visit (or a total of 9 months).

## 2021-06-06 NOTE — TELEPHONE ENCOUNTER
RN cannot approve Refill Request    RN can NOT refill this medication med is not covered by policy/route to provider. Last office visit: 10/14/2019 Saad Marcelo MD Last Physical: 10/22/2018 Last MTM visit: Visit date not found Last visit same specialty: 10/14/2019 Saad Marcelo MD.  Next visit within 3 mo: Visit date not found  Next physical within 3 mo: Visit date not found      Lani Salas, [facility] Triage/Med Refill 2/18/2020    Requested Prescriptions   Pending Prescriptions Disp Refills     warfarin ANTICOAGULANT (COUMADIN/JANTOVEN) 4 MG tablet [Pharmacy Med Name: Warfarin Sodium Oral Tablet 4 MG] 90 tablet 0     Sig: TAKE 1 TABLET BY MOUTH ONCE DAILY. (TAKE WITH 1 MG= 5mg daily)  ADJUST DOSE BASED ON INR RESULTS AS DIRECTED.       Warfarin Refill Protocol  Failed - 2/18/2020  1:02 PM        Failed -  Route to appropriate pool/provider     Last Anticoagulation Summary:           Passed - Provider visit in last year     Last office visit with prescriber/PCP: 10/14/2019 Saad Marcelo MD OR same dept: 10/14/2019 Saad Marcelo MD OR same specialty: 10/14/2019 Saad Marcelo MD  Last physical: 10/22/2018 Last MTM visit: Visit date not found    Next appt within 3 mo: Visit date not found Next physical within 3 mo: Visit date not found  Prescriber OR PCP: Saad Marcelo MD  Last diagnosis associated with med order: 1. Chronic deep vein thrombosis (DVT) of proximal vein of lower extremity, unspecified laterality (H)  - warfarin ANTICOAGULANT (COUMADIN/JANTOVEN) 4 MG tablet [Pharmacy Med Name: Warfarin Sodium Oral Tablet 4 MG]; TAKE 1 TABLET BY MOUTH ONCE DAILY. (TAKE WITH 1 MG= 5mg daily)  ADJUST DOSE BASED ON INR RESULTS AS DIRECTED.  Dispense: 90 tablet; Refill: 0    If protocol passes may refill for 6 months if within 3 months of last provider visit (or a total of 9 months).

## 2021-06-07 NOTE — TELEPHONE ENCOUNTER
Who is her hematologist and when was her last INR?  I do not see any result in our record nor in care everywhere.

## 2021-06-07 NOTE — TELEPHONE ENCOUNTER
I do not see any INR drawn since August/September 2019.  If she is still on warfarin, who is managing her anticoagulation?

## 2021-06-07 NOTE — TELEPHONE ENCOUNTER
Who is calling:  Patient   Reason for Call:  Patient stated that she has blood clotting disorders and her INR is being managed by her hematologist. Patient stated that her hematologist would like to keep her INR between 2.5-3.5.   Date of last appointment with primary care: n/a  Okay to leave a detailed message: Yes  [phone]

## 2021-06-10 NOTE — TELEPHONE ENCOUNTER
Patient Returning Call  Reason for call:  Patient returning call  Information relayed to patient:    Abbie Moreno, CMACertified Medical AssistantSigned  08/03/2020                Mailed letter  Melissa Moreno CSS                         Saad Marcelo, MDPhysicianSigned  08/02/2020                LETTER WRITTEN AUG 2,2020                  Patient has additional questions:  No  If YES, what are your questions/concerns:  Patient verbalized understanding above message .  Okay to leave a detailed message?: No

## 2021-06-10 NOTE — TELEPHONE ENCOUNTER
RN cannot approve Refill Request    RN can NOT refill this medication Protocol failed and NO refill given. Last office visit: 10/14/2019 Saad Marcelo MD Last Physical: 10/22/2018 Last MTM visit: Visit date not found Last visit same specialty: 10/14/2019 Saad Marcelo MD.  Next visit within 3 mo: Visit date not found  Next physical within 3 mo: Visit date not found      Alis Hazel, [facility] Triage/Med Refill 8/14/2020    Requested Prescriptions   Pending Prescriptions Disp Refills     losartan (COZAAR) 100 MG tablet [Pharmacy Med Name: Losartan Potassium Oral Tablet 100 MG] 90 tablet 3     Sig: TAKE 1 TABLET  BY MOUTH ONCE A DAY       Angiotensin Receptor Blocker Protocol Failed - 8/14/2020  2:05 AM        Failed - Serum potassium within the past 12 months     No results found for: LN-POTASSIUM          Failed - Serum creatinine within the past 12 months     Creatinine   Date Value Ref Range Status   07/29/2019 0.75 0.60 - 1.10 mg/dL Final             Passed - PCP or prescribing provider visit in past 12 months       Last office visit with prescriber/PCP: 10/14/2019 Saad Marcelo MD OR same dept: 10/14/2019 Saad Marcelo MD OR same specialty: 10/14/2019 Saad Marcelo MD  Last physical: 10/22/2018 Last MTM visit: Visit date not found   Next visit within 3 mo: Visit date not found  Next physical within 3 mo: Visit date not found  Prescriber OR PCP: Saad Marcelo MD  Last diagnosis associated with med order: 1. HTN (hypertension)  - losartan (COZAAR) 100 MG tablet [Pharmacy Med Name: Losartan Potassium Oral Tablet 100 MG]; TAKE 1 TABLET  BY MOUTH ONCE A DAY  Dispense: 90 tablet; Refill: 0  - hydroCHLOROthiazide (HYDRODIURIL) 25 MG tablet [Pharmacy Med Name: hydroCHLOROthiazide Oral Tablet 25 MG]; TAKE 1 TABLET BY MOUTH ONCE DAILY  Dispense: 90 tablet; Refill: 0    If protocol passes may refill for 12 months if within 3 months of last provider visit (or a total of 15 months).              Passed - Blood pressure filed in past 12 months     BP Readings from Last 1 Encounters:   10/14/19 114/78                hydroCHLOROthiazide (HYDRODIURIL) 25 MG tablet [Pharmacy Med Name: hydroCHLOROthiazide Oral Tablet 25 MG] 90 tablet 3     Sig: TAKE 1 TABLET BY MOUTH ONCE DAILY       Diuretics/Combination Diuretics Refill Protocol  Failed - 8/14/2020  2:05 AM        Failed - Serum Potassium in past 12 months      No results found for: LN-POTASSIUM          Failed - Serum Sodium in past 12 months      No results found for: LN-SODIUM          Failed - Serum Creatinine in past 12 months      Creatinine   Date Value Ref Range Status   07/29/2019 0.75 0.60 - 1.10 mg/dL Final             Passed - Visit with PCP or prescribing provider visit in past 12 months     Last office visit with prescriber/PCP: 10/14/2019 Saad Marcelo MD OR same dept: 10/14/2019 Saad Marcelo MD OR same specialty: 10/14/2019 Saad Marcelo MD  Last physical: 10/22/2018 Last MTM visit: Visit date not found   Next visit within 3 mo: Visit date not found  Next physical within 3 mo: Visit date not found  Prescriber OR PCP: Saad Marcelo MD  Last diagnosis associated with med order: 1. HTN (hypertension)  - losartan (COZAAR) 100 MG tablet [Pharmacy Med Name: Losartan Potassium Oral Tablet 100 MG]; TAKE 1 TABLET  BY MOUTH ONCE A DAY  Dispense: 90 tablet; Refill: 0  - hydroCHLOROthiazide (HYDRODIURIL) 25 MG tablet [Pharmacy Med Name: hydroCHLOROthiazide Oral Tablet 25 MG]; TAKE 1 TABLET BY MOUTH ONCE DAILY  Dispense: 90 tablet; Refill: 0    If protocol passes may refill for 12 months if within 3 months of last provider visit (or a total of 15 months).             Passed - Blood pressure on file in past 12 months     BP Readings from Last 1 Encounters:   10/14/19 114/78

## 2021-06-10 NOTE — TELEPHONE ENCOUNTER
Medication Request  Medication name:   mirtazapine (REMERON SOL-TAB) 15 MG disintegrating tablet  180 tablet  0  9/25/2019      Sig: DISSOLVE 1 TABLET ON THE TONGUE EVERY NIGHT AT BEDTIME FOR 7 NIGHTS, THEN DISSOLVE 2 TABLETS ON THE TONGUE EVERY NIGHT AT BEDTIME         Requested Pharmacy: [facility]  Reason for request: States her anxiety has increased and she would like to go back on this medication as it did help her in the past.    Okay to leave a detailed message: yes

## 2021-06-10 NOTE — TELEPHONE ENCOUNTER
Who is requesting the letter?  Patient   Why is the letter needed? Patient stated that she needs a statement from Saad Marcelo MD stating that she broke right ankle and as unable to walk and had no one at home so she had to go into assisted living after surgery in order for file and cover for her expenses. Patient stated that to write that it was necessary for her to stay at the assisted living for 2 months and to use wheelchair and equipment while at assisted living facility.  How would you like this letter returned? Mail   Okay to leave a detailed message? Yes  [phone]    FYI: Addressed on file verified with patient.

## 2021-06-10 NOTE — PROGRESS NOTES
OFFICE VISIT NOTE    Subjective:   Chief Complaint:  No chief complaint on file.    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  She is on chronic warfarin therapy.  She gets some discomfort and points to the lower left calf.  Area of concern is a varicose vein.      Current Outpatient Prescriptions   Medication Sig     atenolol (TENORMIN) 25 MG tablet      atenolol (TENORMIN) 25 MG tablet TAKE 1 TABLET BY MOUTH DAILY     atorvastatin (LIPITOR) 40 MG tablet TAKE 1 TABLET BY MOUTH DAILY     hydroCHLOROthiazide (HYDRODIURIL) 25 MG tablet TAKE 1 TABLET BY MOUTH DAILY     loperamide (IMODIUM) 2 mg capsule Take 2 mg by mouth as needed.      losartan (COZAAR) 100 MG tablet TAKE 1 TABLET BY MOUTH DAILY     methylPREDNISolone (MEDROL) 4 MG tablet Take 1 tablet (4 mg total) by mouth 2 (two) times a day for 5 days.     SACCHAROMYCES BOULARDII (PROBIOTIC, S.BOULARDII, ORAL) Take by mouth.     warfarin (COUMADIN) 1 MG tablet Take 1 mg by mouth daily. 5 mg by mouth daily. Adjust dose based on INR results as directed.     warfarin (COUMADIN) 4 MG tablet Take 4 mg by mouth daily. 5 mg daily Adjust dose based on INR results as directed.       Review of Systems:  A comprehensive review of systems is negative except for the comments above    Objective:    There were no vitals taken for this visit.  GENERAL: No acute distress.  On exam.  She is afebrile blood pressure is 128/76.  Oxygen saturations 98%.  Superficial varicose veins.  1 of them is inflamed and slightly tender.  No swelling of the calf.  No edema of the leg.  No evidence of any cellulitis.  She has had therapeutic INRs the past several months.    Assessment & Plan   Fabi Smith is a 77 y.o. female.    A tender varicose vein.  This represents a superficial phlebitis only.  She should continue warfarin.  She is getting an INR checked later today.  Hot packs to the leg twice daily for 30 minutes.  Medrol 4 mg twice daily for 5 days.   The leg should slowly improve.    Diagnoses and all orders for this visit:    Varicose veins of left lower extremity    Other orders  -     methylPREDNISolone (MEDROL) 4 MG tablet; Take 1 tablet (4 mg total) by mouth 2 (two) times a day for 5 days.  Dispense: 10 tablet; Refill: 0        Saad Marcelo MD  Transcription using voice recognition software, may contain typographical errors.

## 2021-06-10 NOTE — TELEPHONE ENCOUNTER
Who is requesting the letter?  Patient   Why is the letter needed? Patient is requesting a new letter because the last letter was not signed. Please advise.  How would you like this letter returned? Please mail to her home address.  Okay to leave a detailed message? Yes

## 2021-06-10 NOTE — TELEPHONE ENCOUNTER
Are you Ok with the refill or should it wait till Monday when Dr Marcelo is back    Siobhan Toth CMA ([facility])

## 2021-06-11 NOTE — TELEPHONE ENCOUNTER
RN cannot approve Refill Request    RN can NOT refill this medication med is not covered by policy/route to provider. Last office visit: Visit date not found Last Physical: Visit date not found Last MTM visit: Visit date not found Last visit same specialty: 9/23/2020 Saad Marcelo MD.  Next visit within 3 mo: Visit date not found  Next physical within 3 mo: Visit date not found      Kelseytoribio Muñoz, [facility] Triage/Med Refill 9/29/2020    Requested Prescriptions   Pending Prescriptions Disp Refills     warfarin ANTICOAGULANT (COUMADIN/JANTOVEN) 1 MG tablet [Pharmacy Med Name: Warfarin Sodium Oral Tablet 1 MG] 90 tablet 0     Sig: TAKE 1 TABLET (1 MG) BY MOUTH ONCE DAILY. TAKE WITH 4 MG TABLET FOR TOTAL DAILY DOSE OF 5MG. ADJUST DOSE BASED ON INR RESULTS AS DIRECTED.       Warfarin Refill Protocol  Failed - 9/28/2020  3:03 PM        Failed -  Route to appropriate pool/provider     Last Anticoagulation Summary:           Passed - Provider visit in last year     Last office visit with prescriber/PCP: Visit date not found OR same dept: 9/23/2020 Saad Marcelo MD OR same specialty: 9/23/2020 Saad Marcelo MD  Last physical: Visit date not found Last MTM visit: Visit date not found    Next appt within 3 mo: Visit date not found Next physical within 3 mo: Visit date not found  Prescriber OR PCP: Niels Hardy MD  Last diagnosis associated with med order: 1. Coagulation factor deficiency syndrome (H)  - warfarin ANTICOAGULANT (COUMADIN/JANTOVEN) 1 MG tablet [Pharmacy Med Name: Warfarin Sodium Oral Tablet 1 MG]; TAKE 1 TABLET (1 MG) BY MOUTH ONCE DAILY. TAKE WITH 4 MG TABLET FOR TOTAL DAILY DOSE OF 5MG. ADJUST DOSE BASED ON INR RESULTS AS DIRECTED.  Dispense: 90 tablet; Refill: 0    If protocol passes may refill for 6 months if within 3 months of last provider visit (or a total of 9 months).

## 2021-06-11 NOTE — TELEPHONE ENCOUNTER
7 prescription for regular 15 mg tablets.  Not the sublingual dissolvable type.  1 daily for 7 nights then increase to 2 daily.  #180

## 2021-06-11 NOTE — PROGRESS NOTES
OFFICE VISIT NOTE    Subjective:   Chief Complaint:  Follow-up (fasting lab/lipid brought labs from oncology yearly check-up) and Anxiety (patient has been having episodes of anxiety, using mirtazapine for this wants to discuss how to take it as it was prescibed for sleep)    80-year-old woman with a history of hyperlipidemia as well as clotting disorder.  She is on long-term warfarin.  In today with a complaint of increasing anxiety.  Takes Remeron but only on a as needed basis.  He is sleeping okay.  Feels she needs something for her anxiety.  She has some compulsive obsessive-compulsive disease.  She has been good taking warfarin.  She is a past history of breast cancer with no recurrences.    Current Outpatient Medications   Medication Sig     atenolol (TENORMIN) 25 MG tablet TAKE 1 TABLET (25 MG) BY MOUTH ONCE A DAY     atorvastatin (LIPITOR) 40 MG tablet Take 1 tablet (40 mg total) by mouth daily.     hydroCHLOROthiazide (HYDRODIURIL) 25 MG tablet TAKE 1 TABLET BY MOUTH ONCE DAILY     losartan (COZAAR) 100 MG tablet TAKE 1 TABLET  BY MOUTH ONCE A DAY     mirtazapine (REMERON) 15 MG tablet Take 2 tablets (30 mg total) by mouth at bedtime. Patient to take one (15mg) at HS x 7 days then 30 mg daily at HS (Patient taking differently: Take 30 mg by mouth at bedtime. Bedtime PRN and during the day if needed for anxiety)     SACCHAROMYCES BOULARDII (PROBIOTIC, S.BOULARDII, ORAL) Take by mouth.     warfarin ANTICOAGULANT (COUMADIN/JANTOVEN) 1 MG tablet TAKE 1 TABLET (1 MG) BY MOUTH ONCE DAILY. TAKE WITH 4 MG TABLET FOR TOTAL DAILY DOSE OF 5MG. ADJUST DOSE BASED ON INR RESULTS AS DIRECTED.     warfarin ANTICOAGULANT (COUMADIN/JANTOVEN) 4 MG tablet TAKE 1 TABLET BY MOUTH ONCE DAILY. (TAKE WITH 1 MG= 5mg daily)  ADJUST DOSE BASED ON INR RESULTS AS DIRECTED.     ALPRAZolam (XANAX) 0.25 MG tablet 1 tab p.o. twice daily as needed     busPIRone (BUSPAR) 10 MG tablet 1 tab p.o. 3 times daily as needed for anxiety      "loperamide (IMODIUM) 2 mg capsule Take 2 mg by mouth as needed.        PSFHx: Tobacco Status:  She  reports that she quit smoking about 35 years ago. Her smoking use included cigarettes. She has a 15.00 pack-year smoking history. She has never used smokeless tobacco.    Review of Systems:  A comprehensive review of systems is negative except for the comments above    Objective:    Ht 5' 4\" (1.626 m)   Wt 155 lb (70.3 kg)   LMP  (LMP Unknown)   BMI 26.61 kg/m    GENERAL: No acute distress.  Weight is stable..  Blood pressure 126/80.  Pulse 56.  Heart shows a regular rhythm.  She has a grade 1/6 to 2/6 systolic ejection murmur at the base suggesting an aortic outflow type murmur.  No diastolic murmur.  Lungs are clear.  Mentally she is sharp and alert.  She shows good insight.  Denies depression.  No evidence of any psychosis.    Assessment & Plan   Fabi Smith is a 80 y.o. female.    Definitely having symptoms of increased anxiety.  I have asked her to take Remeron on a daily basis.  She can use BuSpar 10 mg 3 times daily as needed.  She tried taking hydroxyzine in the past and made her sick.  Trying to avoid benzodiazepines.    Diagnoses and all orders for this visit:    Depression, unspecified depression type  .  Continue Remeron.  Coagulation factor deficiency syndrome (H)  Continues on warfarin.    Benign essential hypertension  Medications remain exactly the same.  Anxiety  -     busPIRone (BUSPAR) 10 MG tablet; 1 tab p.o. 3 times daily as needed for anxiety  Dispense: 30 tablet; Refill: 0  If not doing well, consider switching to citalopram or sertraline  Need for vaccination  -     Influenza,Quad,High Dose,PF 65 YR+    Mixed hyperlipidemia  -     Lipid Cascade    Flu shot is given today.    The following high BMI interventions were performed this visit: encouragement to exercise    Saad Marcelo MD  Transcription using voice recognition software, may contain typographical errors.    "

## 2021-06-11 NOTE — TELEPHONE ENCOUNTER
Medication Request  Medication name:    Disp  Refills  Start  End     mirtazapine (REMERON SOL-TAB) 15 MG disintegrating tablet  180 tablet  0  8/31/2020      Sig: DISSOLVE 1 TABLET ON THE TONGUE EVERY NIGHT AT BEDTIME FOR 7 NIGHTS, THEN DISSOLVE 2 TABLETS ON THE TONGUE EVERY NIGHT AT BEDTIME     Sent to pharmacy as: mirtazapine 15 mg disintegrating tablet (REMERON SOL-TAB)     Notes to Pharmacy: **Patient requests 90 days supply**     E-Prescribing Status: Receipt confirmed by pharmacy (8/31/2020  7:14 AM CDT)        Requested Pharmacy: [facility]  Reason for request: Caller is wanting this to be in Tablets and not this kind if Saad Marcelo MD can send that over.   When did you use medication last?:    Patient offered appointment:  patient declined  Okay to leave a detailed message: yes

## 2021-06-11 NOTE — TELEPHONE ENCOUNTER
Prescription set up and sent to PCP to sign and send. Left message to notify patient.    Joleen Mensah, CMA

## 2021-06-11 NOTE — TELEPHONE ENCOUNTER
Refill Approved    Rx renewed per Medication Renewal Policy. Medication was last renewed on 9/27/19.    Alis Hazel, [facility] Triage/Med Refill 9/28/2020     Requested Prescriptions   Pending Prescriptions Disp Refills     atorvastatin (LIPITOR) 40 MG tablet [Pharmacy Med Name: Atorvastatin Calcium Oral Tablet 40 MG] 90 tablet 0     Sig: Take 1 tablet (40 mg) by mouth once daily.       Statins Refill Protocol (Hmg CoA Reductase Inhibitors) Passed - 9/25/2020  2:00 AM        Passed - PCP or prescribing provider visit in past 12 months      Last office visit with prescriber/PCP: 9/23/2020 Saad Marcelo MD OR same dept: 9/23/2020 Saad Marcelo MD OR same specialty: 9/23/2020 Saad aMrcelo MD  Last physical: 10/22/2018 Last MTM visit: Visit date not found   Next visit within 3 mo: Visit date not found  Next physical within 3 mo: Visit date not found  Prescriber OR PCP: Saad Marcelo MD  Last diagnosis associated with med order: 1. Mixed hyperlipidemia  - atorvastatin (LIPITOR) 40 MG tablet [Pharmacy Med Name: Atorvastatin Calcium Oral Tablet 40 MG]; Take 1 tablet (40 mg) by mouth once daily.  Dispense: 90 tablet; Refill: 0    If protocol passes may refill for 12 months if within 3 months of last provider visit (or a total of 15 months).

## 2021-06-12 NOTE — TELEPHONE ENCOUNTER
Refill Request  Did you contact pharmacy: Yes  Medication name:   Requested Prescriptions     Pending Prescriptions Disp Refills     busPIRone (BUSPAR) 10 MG tablet [Pharmacy Med Name: busPIRone HCl Oral Tablet 10 MG] 30 tablet 0     Sig: TAKE ONE TABLET BY MOUTH THREE TIMES DAILY AS NEEDED FOR ANXIETY     Who prescribed the medication:   JERMAINE JAIMES  Requested Pharmacy: [facility]  Is patient out of medication: Yes  Patient notified refills processed in 3 business days:  yes  Okay to leave a detailed message: yes      Please fill as soon as possible per patient request.  Thank you.

## 2021-06-12 NOTE — TELEPHONE ENCOUNTER
Refill Approved    Rx renewed per Medication Renewal Policy. Medication was last renewed on 10/28/19.    Alis Hazel, [facility] Triage/Med Refill 10/27/2020     Requested Prescriptions   Pending Prescriptions Disp Refills     atenoloL (TENORMIN) 25 MG tablet [Pharmacy Med Name: Atenolol Oral Tablet 25 MG] 90 tablet 0     Sig: TAKE 1 TABLET (25 MG) BY MOUTH ONCE A DAY       Beta-Blockers Refill Protocol Passed - 10/26/2020  2:00 AM        Passed - PCP or prescribing provider visit in past 12 months or next 3 months     Last office visit with prescriber/PCP: 9/23/2020 Saad Marcelo MD OR same dept: 9/23/2020 Saad Marcelo MD OR same specialty: 9/23/2020 Saad Marcelo MD  Last physical: 10/22/2018 Last MTM visit: Visit date not found   Next visit within 3 mo: Visit date not found  Next physical within 3 mo: Visit date not found  Prescriber OR PCP: Saad Marcelo MD  Last diagnosis associated with med order: 1. HTN (hypertension)  - atenoloL (TENORMIN) 25 MG tablet [Pharmacy Med Name: Atenolol Oral Tablet 25 MG]; TAKE 1 TABLET (25 MG) BY MOUTH ONCE A DAY  Dispense: 90 tablet; Refill: 0    If protocol passes may refill for 12 months if within 3 months of last provider visit (or a total of 15 months).             Passed - Blood pressure filed in past 12 months     BP Readings from Last 1 Encounters:   09/23/20 126/80

## 2021-06-12 NOTE — TELEPHONE ENCOUNTER
Who is calling:  Patient   Reason for Call:  Caller is needing this medication sent over as soon as possible due to the weekend coming and she is out.   Date of last appointment with primary care:   Okay to leave a detailed message: Yes

## 2021-06-12 NOTE — TELEPHONE ENCOUNTER
Refill Approved    Rx renewed per Medication Renewal Policy. Medication was last renewed on 09/23/2020.  Last office visit was 09/23/2020 with PCP.    Lottie Owen, [facility] Triage/Med Refill 10/16/2020     Requested Prescriptions   Pending Prescriptions Disp Refills     busPIRone (BUSPAR) 10 MG tablet [Pharmacy Med Name: busPIRone HCl Oral Tablet 10 MG] 30 tablet 0     Sig: TAKE ONE TABLET BY MOUTH THREE TIMES DAILY AS NEEDED FOR ANXIETY       Tricyclics/Misc Antidepressant/Antianxiety Meds Refill Protocol Passed - 10/16/2020  3:57 PM        Passed - PCP or prescribing provider visit in last year     Last office visit with prescriber/PCP: 9/23/2020 Saad Marcelo MD OR same dept: 9/23/2020 Saad Marcelo MD OR same specialty: 9/23/2020 Saad Marcelo MD  Last physical: 10/22/2018 Last MTM visit: Visit date not found   Next visit within 3 mo: Visit date not found  Next physical within 3 mo: Visit date not found  Prescriber OR PCP: Saad Marcelo MD  Last diagnosis associated with med order: 1. Anxiety  - busPIRone (BUSPAR) 10 MG tablet [Pharmacy Med Name: busPIRone HCl Oral Tablet 10 MG]; TAKE ONE TABLET BY MOUTH THREE TIMES DAILY AS NEEDED FOR ANXIETY   Dispense: 30 tablet; Refill: 0    If protocol passes may refill for 12 months if within 3 months of last provider visit (or a total of 15 months).

## 2021-06-13 NOTE — PROGRESS NOTES
"OFFICE VISIT NOTE    Subjective:   Chief Complaint:  Hypertension; Follow-up; and Medication Refill    77-year-old woman in for follow-up regarding hypertension as well as hyperlipidemia.  She also has history of DVT and is taking chronic warfarin therapy for this.  Also sees oncology regarding her breast cancer since 2001.  No recurrence.  She is feeling well.    Current Outpatient Prescriptions   Medication Sig     ALPRAZolam (XANAX) 0.25 MG tablet Take 1 tablet (0.25 mg total) by mouth 3 (three) times a day as needed for anxiety.     atenolol (TENORMIN) 25 MG tablet TAKE 1 TABLET BY MOUTH DAILY     atorvastatin (LIPITOR) 40 MG tablet TAKE 1 TABLET BY MOUTH DAILY     hydroCHLOROthiazide (HYDRODIURIL) 25 MG tablet TAKE 1 TABLET BY MOUTH DAILY     losartan (COZAAR) 100 MG tablet TAKE 1 TABLET BY MOUTH DAILY     metoprolol succinate (TOPROL-XL) 25 MG TAKE 1 TABLET BY MOUTH EVERY DAY     SACCHAROMYCES BOULARDII (PROBIOTIC, S.BOULARDII, ORAL) Take by mouth.     warfarin (COUMADIN) 1 MG tablet Take 1 mg by mouth daily. 5 mg by mouth daily. Adjust dose based on INR results as directed.     warfarin (COUMADIN) 4 MG tablet Take 4 mg by mouth daily. 5 mg daily Adjust dose based on INR results as directed.     hydrOXYzine (VISTARIL) 25 MG capsule 1 capsule p.o. 3 times daily as needed     loperamide (IMODIUM) 2 mg capsule Take 2 mg by mouth as needed.        PSFHx: Tobacco Status:  She  reports that she quit smoking about 32 years ago. Her smoking use included Cigarettes. She has a 15.00 pack-year smoking history. She has never used smokeless tobacco.    Review of Systems:  A comprehensive review of systems is negative except for the comments above    Objective:    /62 (Patient Site: Left Arm, Patient Position: Sitting, Cuff Size: Adult Regular)  Pulse 67  Ht 5' 4\" (1.626 m)  Wt 154 lb 1.9 oz (69.9 kg)  LMP  (LMP Unknown)  SpO2 96%  Breastfeeding? No  BMI 26.45 kg/m2  GENERAL: No acute distress.  Today's blood " pressure is 130/84.  Pulse 66.  No carotid bruits.  Lungs are clear  Pedal pulses normal for age.  Heart shows a sinus bradycardia without any significant murmur gallop or rub.  No edema  Calves normal.  No signs of any active phlebitis.  Eyes are normal no hemorrhages or exudates seen.  Neurologic exam remains normal    Assessment & Plan   Fabi Smith is a 77 y.o. female.    She is stable.  She wants to shot.  I will get an abdominal ultrasound to rule out aneurysm since she has never been screened.    Diagnoses and all orders for this visit:    Medication management    HTN (hypertension)  -     atenolol (TENORMIN) 25 MG tablet; TAKE 1 TABLET BY MOUTH DAILY  Dispense: 90 tablet; Refill: 3  -     hydroCHLOROthiazide (HYDRODIURIL) 25 MG tablet; TAKE 1 TABLET BY MOUTH DAILY  Dispense: 90 tablet; Refill: 3  -     losartan (COZAAR) 100 MG tablet; TAKE 1 TABLET BY MOUTH DAILY  Dispense: 90 tablet; Refill: 3  -     metoprolol succinate (TOPROL-XL) 25 MG; TAKE 1 TABLET BY MOUTH EVERY DAY  Dispense: 90 tablet; Refill: 3  -     US Abdominal Aorta; Future; Expected date: 9/25/17    Mixed hyperlipidemia  -     atorvastatin (LIPITOR) 40 MG tablet; TAKE 1 TABLET BY MOUTH DAILY  Dispense: 90 tablet; Refill: 3    Need for vaccination  -     Influenza High Dose, Seasonal 65+ yrs        The following high BMI interventions were performed this visit: encouragement to exercise    Saad Marcelo MD  Transcription using voice recognition software, may contain typographical errors.

## 2021-06-13 NOTE — PROGRESS NOTES
"OFFICE VISIT NOTE    Subjective:   Chief Complaint:  Anxiety (follow up. it is getting worse)    80-year-old woman with chronic anxiety and associated depression.  She recently has been taking Remeron 30 mg a day at night.  She takes BuSpar twice daily for anxiety.  She still continues have issues with anxiety and some depression.  Sleep is a little better.  She does not tolerate Zoloft or Prozac.  She has tried those in the past.    Current Outpatient Medications   Medication Sig     atenoloL (TENORMIN) 25 MG tablet TAKE 1 TABLET (25 MG) BY MOUTH ONCE A DAY     atorvastatin (LIPITOR) 40 MG tablet Take 1 tablet (40 mg) by mouth once daily.     busPIRone (BUSPAR) 10 MG tablet TAKE ONE TABLET BY MOUTH THREE TIMES DAILY AS NEEDED FOR ANXIETY     hydroCHLOROthiazide (HYDRODIURIL) 25 MG tablet TAKE 1 TABLET BY MOUTH ONCE DAILY     loperamide (IMODIUM) 2 mg capsule Take 2 mg by mouth as needed.      losartan (COZAAR) 100 MG tablet TAKE 1 TABLET  BY MOUTH ONCE A DAY     SACCHAROMYCES BOULARDII (PROBIOTIC, S.BOULARDII, ORAL) Take by mouth.     warfarin ANTICOAGULANT (COUMADIN/JANTOVEN) 1 MG tablet TAKE 1 TABLET (1 MG) BY MOUTH ONCE DAILY. TAKE WITH 4 MG TABLET FOR TOTAL DAILY DOSE OF 5MG. ADJUST DOSE BASED ON INR RESULTS AS DIRECTED.     warfarin ANTICOAGULANT (COUMADIN/JANTOVEN) 4 MG tablet TAKE 1 TABLET BY MOUTH ONCE DAILY. (TAKE WITH 1 MG= 5mg daily)  ADJUST DOSE BASED ON INR RESULTS AS DIRECTED.     ALPRAZolam (XANAX) 0.25 MG tablet 1 tab p.o. twice daily as needed     escitalopram oxalate (LEXAPRO) 10 MG tablet Take 1 tablet (10 mg total) by mouth daily.       PSFHx: Tobacco Status:  She  reports that she quit smoking about 35 years ago. Her smoking use included cigarettes. She has a 15.00 pack-year smoking history. She has never used smokeless tobacco.    Review of Systems:  A comprehensive review of systems is negative except for the comments above    Objective:    Ht 5' 4\" (1.626 m)   Wt 157 lb (71.2 kg)   LMP  " (LMP Unknown)   BMI 26.95 kg/m    GENERAL: No acute distress.  Pleasant woman who has gained a few pounds since I last saw her.  Blood pressure 124/78.  Pulse 76 and regular.  Mentally she is sharp and alert.  Affect seems appropriately.  She answers all questions appropriately.  No signs of any psychosis.  No suicide ideation    Assessment & Plan   Fabi Smith is a 80 y.o. female.    Depression with some component of anxiety.  Discontinue Remeron.  Start her on Lexapro 10 mg p.o. daily.  Let me know in 1 month if she is doing better.  If needed, we can always increase that dosage.    Diagnoses and all orders for this visit:    Depression, unspecified depression type  -     escitalopram oxalate (LEXAPRO) 10 MG tablet; Take 1 tablet (10 mg total) by mouth daily.  Dispense: 30 tablet; Refill: 4    Chronic insomnia  With Remeron being stopped, insomnia may worsen.  We will wait and see.      The following high BMI interventions were performed this visit: encouragement to exercise    Saad Marcelo MD  Transcription using voice recognition software, may contain typographical errors.

## 2021-06-14 NOTE — TELEPHONE ENCOUNTER
Unfortunately I do not know of any specific psychiatrist to recommend for Ms. Smtih.   Sub-Acute rehab

## 2021-06-14 NOTE — PROGRESS NOTES
"OFFICE VISIT NOTE    Subjective:   Chief Complaint:  Follow-up (started on lexapro on developped insomnia, lexapro gave her tremors )    80-year-old woman with a history of depression-anxiety.  Recently tried her on Lexapro but that caused more insomnia.  She wants to get back on mirtazapine which she thought did help her more.  Otherwise is feeling okay.  No cardiopulmonary symptoms.    Current Outpatient Medications   Medication Sig     busPIRone (BUSPAR) 10 MG tablet 1 tablet p.o. at at bedtime.     hydroCHLOROthiazide (HYDRODIURIL) 25 MG tablet TAKE 1 TABLET BY MOUTH ONCE DAILY     loperamide (IMODIUM) 2 mg capsule Take 2 mg by mouth as needed.      losartan (COZAAR) 100 MG tablet TAKE 1 TABLET  BY MOUTH ONCE A DAY     SACCHAROMYCES BOULARDII (PROBIOTIC, S.BOULARDII, ORAL) Take by mouth.     warfarin ANTICOAGULANT (COUMADIN/JANTOVEN) 1 MG tablet TAKE 1 TABLET (1 MG) BY MOUTH ONCE DAILY. TAKE WITH 4 MG TABLET FOR TOTAL DAILY DOSE OF 5MG. ADJUST DOSE BASED ON INR RESULTS AS DIRECTED.     warfarin ANTICOAGULANT (COUMADIN/JANTOVEN) 4 MG tablet TAKE 1 TABLET BY MOUTH ONCE DAILY. (TAKE WITH 1 MG= 5mg daily)  ADJUST DOSE BASED ON INR RESULTS AS DIRECTED.     atenoloL (TENORMIN) 25 MG tablet TAKE 1 TABLET (25 MG) BY MOUTH ONCE A DAY     atorvastatin (LIPITOR) 40 MG tablet Take 1 tablet (40 mg) by mouth once daily.     mirtazapine (REMERON) 15 MG tablet 1 tablet at bedtime for 2 days then increase to 2 tablets daily.       PSFHx: Tobacco Status:  She  reports that she quit smoking about 35 years ago. Her smoking use included cigarettes. She has a 15.00 pack-year smoking history. She has never used smokeless tobacco.    Review of Systems:  A comprehensive review of systems is negative except for the comments above    Objective:    Ht 5' 4\" (1.626 m)   Wt 157 lb (71.2 kg)   LMP  (LMP Unknown)   BMI 26.95 kg/m    GENERAL: No acute distress.  Blood pressure today is good 1[birth year]1982.  Pulse 76 and regular.  Sharp alert.  No " signs of psychosis.  Mood seems excellent.  All questions answered appropriately.  Shows a regular rhythm without any ectopic beats.  No edema.  No JVD.  Assessment & Plan   Fabi Smith is a 80 y.o. female.    Patient anxiety-depression.  Start back on mirtazapine 15 mg daily for 7 days then increase to 30 mg at bedtime.  That I think should do the job for her.  Will contact us if has any problems.    Diagnoses and all orders for this visit:    Depression, unspecified depression type  -     mirtazapine (REMERON) 15 MG tablet; 1 tablet at bedtime for 2 days then increase to 2 tablets daily.  Dispense: 180 tablet; Refill: 3    HTN (hypertension)  -     atenoloL (TENORMIN) 25 MG tablet; TAKE 1 TABLET (25 MG) BY MOUTH ONCE A DAY  Dispense: 90 tablet; Refill: 3    Mixed hyperlipidemia  -     atorvastatin (LIPITOR) 40 MG tablet; Take 1 tablet (40 mg) by mouth once daily.  Dispense: 90 tablet; Refill: 3    Benign essential hypertension            Saad Marcelo MD  Transcription using voice recognition software, may contain typographical errors.

## 2021-06-14 NOTE — PATIENT INSTRUCTIONS - HE
For your insomnia:  Just take 15 mg of the mirtazapine  Continue the 6 mg of the melatonin  Will start 25-50 mg of the trazodone   We will check your thyroid level today

## 2021-06-14 NOTE — TELEPHONE ENCOUNTER
"Reason for Call:  Other call back      Detailed comments: Patient calling this morning to report that she saw Dr Reid yesterday.  At that time he gave rx for trazadone to assist patient with difficulty sleeping.  Patient reports that after reading thru the medication information, it states \"should not be taken by people with history of Long QT wave\".  Patient does have history of this and is wondering if med is still safe, if not can something else be sent in instead?  Please call patient to discuss how she should proceed.    Phone Number Patient can be reached at: Home number on file [phone] (home)    Best Time: Any time    Can we leave a detailed message on this number?: Yes    Call taken on 1/22/2021 at 8:16 AM by Carlos Hubbard    "

## 2021-06-14 NOTE — TELEPHONE ENCOUNTER
Reason for call:  Patient reporting a symptom    Symptom or request: Patient reports increased insomnia ( falling asleep and staying a sleep X 1 week.  Patient reports experiencing increased insomnia, shakiness and tremors while taking the escitalopram oxalate (LEXAPRO) 10 MG tablet.  Patient stopped taking the escitalopram oxalate (LEXAPRO) 10 MG tablet 3 days ago.  Patient reports that the busPIRone (BUSPAR) 10 MG tablet at bedtime is not effective for the insomnia.  Patient is able to relax but not able to fall asleep and sleeps for 3-4 hours the last few days.Patient denies any other symptoms or concerns at this time.      Patient is inquiring about restarting the mirtazapine (REMERON SOL-TAB) 15 MG disintegrating tablet.    Is it safe to restart the mirtazapine (REMERON SOL-TAB) 15 MG disintegrating tablet as she stopped the escitalopram oxalate (LEXAPRO) 10 MG tablet 3 days ago?    Is it safe to take Melatonin while on Coumadin?    Duration (how long have symptoms been present): X 1 week    Have you been treated for this before? Yes      Phone Number patient can be reached at:    Home number on file [phone] (home) and Cell number on file:    Telephone Information:   Mobile [phone]       Best Time:  As soon as possible    Can we leave a detailed message on this number: Yes    Call taken on 12/30/2020 at 10:23 AM by Leatha Nichole

## 2021-06-14 NOTE — TELEPHONE ENCOUNTER
I think it would be best to continue the Lexapro.  Add buspirone 1 at bedtime which might help with her sleep.  I will call that prescription in for her.

## 2021-06-14 NOTE — TELEPHONE ENCOUNTER
Patient has been off of the lexapro for 5 days. Patient tapered off. Patient will start the Mirtazapine 15 mg. Patient agrees with 3 mg of Melatonin. Patient has a follow up with Dr. aMrcelo on 1/5/21. Patient will see how it goes and follow up with him for any additional direction if need be.

## 2021-06-14 NOTE — TELEPHONE ENCOUNTER
Reason for Call:  Other call back      Detailed comments: pt called inquiring about a name of a psychiatrist.    Phone Number Patient can be reached at: Home number on file [phone] (home)    Best Time:     Can we leave a detailed message on this number?: Yes    Call taken on 1/20/2021 at 2:54 PM by Janine Tabor

## 2021-06-14 NOTE — TELEPHONE ENCOUNTER
Fabi is calling and states that MD Marcelo ordered 10 mg of Lexapro and is taking 10 mg of lexapro a day and can only sleep 4 hours at a time. Fabi previously was taking 5 mg and then went back to 10 mg.   In early morning Fabi is waking around 3am and 4am cannot go back to sleep.  Fabi is requesting to speak with MD Marcelo about getting off Lexapro/does she have to wean off or can she stop,  and if she can take Buspirone.  Please phone Fabi.      COVID 19 Nurse Triage Plan/Patient Instructions    Please be aware that novel coronavirus (COVID-19) may be circulating in the community. If you develop symptoms such as fever, cough, or SOB or if you have concerns about the presence of another infection including coronavirus (COVID-19), please contact your health care provider or visit [email or web address].     Disposition/Instructions    Home care recommended. Follow home care protocol based instructions.    Thank you for taking steps to prevent the spread of this virus.  o Limit your contact with others.  o Wear a simple mask to cover your cough.  o Wash your hands well and often.    Resources    [facility]: About COVID-19: [email or web address]    CDC: What to Do If You're Sick: www.cdc.gov/coronavirus/2019-ncov/about/steps-when-sick.html    CDC: Ending Home Isolation: www.cdc.gov/coronavirus/2019-ncov/hcp/disposition-in-home-patients.html     CDC: Caring for Someone: www.cdc.gov/coronavirus/2019-ncov/if-you-are-sick/care-for-someone.html     [facility]: Interim Guidance for Hospital Discharge to Home: www.health.[facility].[place]/diseases/coronavirus/hcp/hospdischarge.pdf    [facility] clinical trials (COVID-19 research studies): clinicalaffairs.[facility]/umn-clinical-trials     Below are the COVID-19 hotlines at the [place] Department of Health ([facility]). Interpreters are available.   o For health questions: Call 648-857-1562 or 1-825.344.5084 (7 a.m. to 7 p.m.)  o For questions about  schools and childcare: Call 955-219-2208 or 1-260.464.5695 (7 a.m. to 7 p.m.)       Reason for Disposition    Caller has URGENT medication question about med that PCP prescribed and triager unable to answer question    Additional Information    Negative: MORE THAN A DOUBLE DOSE of a prescription or over-the-counter (OTC) drug    Negative: DOUBLE DOSE (an extra dose or lesser amount) of over-the-counter (OTC) drug and any symptoms (e.g., dizziness, nausea, pain, sleepiness)    Negative: DOUBLE DOSE (an extra dose or lesser amount) of prescription drug and any symptoms (e.g., dizziness, nausea, pain, sleepiness)    Negative: Took another person's prescription drug    Negative: DOUBLE DOSE (an extra dose or lesser amount) of prescription drug and NO symptoms (Exception: a double dose of antibiotics)    Negative: Diabetes drug error or overdose (e.g., took wrong type of insulin or took extra dose)    Negative: Caller has medication question about med not prescribed by PCP and triager unable to answer question (e.g., compatibility with other med, storage)    Negative: Request for URGENT new prescription or refill of 'essential' medication (i.e., likelihood of harm to patient if not taken) and triager unable to fill per department policy    Negative: Prescription not at pharmacy and was prescribed today by PCP    Negative: Pharmacy calling with prescription questions and triager unable to answer question    Protocols used: MEDICATION QUESTION CALL-A-[place]

## 2021-06-14 NOTE — PROGRESS NOTES
"  Assessment & Plan   Insomnia, unspecified type  Depression, unspecified depression type  TSH/thyroid cascade pending. Unsure of etiology but have referred to psychiatry. Will trial trazodone to her regimen and have decrease mirtazapine to 15 mg. Will continue to take melatonin.  Continue to encourage good sleep hygiene.  - traZODone (DESYREL) 50 MG tablet  Dispense: 30 tablet; Refill: 0  - Thyroid [place]  - Ambulatory referral to Psychiatry  - mirtazapine (REMERON) 15 MG tablet  Dispense: 180 tablet; Refill: 3    Review of external notes as documented above     25 minutes spent on the date of the encounter doing chart review, history and exam, documentation and further activities as noted above    BMI:   Estimated body mass index is 26.95 kg/m  as calculated from the following:    Height as of this encounter: 5' 4\" (1.626 m).    Weight as of this encounter: 157 lb (71.2 kg).     Return in about 6 weeks (around 3/3/2021).    Jeremias Reid MD  [facility]     Fabi Smith is 80 y.o. and presents to clinic today for the following health issues   HPI Ms. Smith complains of persistent insomnia.  Today SHAVON-7 of 7 and PHQ-9 of 5.  Denies any suicidal homicidal ideations.  Denies any unexplained fever, sweats, chills, unexplained weight loss, tremors, tachycardia.  She does endorse some symptoms suggestive of hyper defecation.  She denies any hair loss.  She simply states that she is unable to fall asleep and is unsure why.  She has both difficulty falling and staying asleep.  SHAVON-7 was 17 back in 2019 September.  States BuSpar worsened her anxiety and despite being on Prozac many decades in the past, resumption of it more recently agitated her.  Recently trialed Lexapro and the fall 2020 and overall states she felt horrible with tremors and this triggered her insomnia.  Symptoms have been present for roughly 2 months.  States she has received 0 sleep in the last 2 " "nights.  Denies drinking any soda but does drink 3 cups of coffee in the a.m. and midday.  Denies illicit drug use.  Denies having a TV at home and drinks more milk before bedtime.  Has a landline phone and endorses good sleep hygiene.  She states hydroxyzine made her feel weird.  She takes her HCTZ in the a.m.  Amenable to referral to see a psychiatry.    Review of Systems  ROS A comprehensive review of systems was performed and was otherwise negative        Objective    /68 (Patient Site: Left Arm, Patient Position: Sitting, Cuff Size: Adult Regular)   Pulse 67   Ht 5' 4\" (1.626 m)   Wt 157 lb (71.2 kg)   LMP  (LMP Unknown)   SpO2 97%   BMI 26.95 kg/m    Body mass index is 26.95 kg/m .  Physical Exam  GENERAL APPEARANCE: Pleasant, nontoxic-appearing, no acute distress   EYES: Eyelids, conjunctiva, and sclera were normal. Pupils were normal.    HEENT: Head normal. Hearing was normal to voice.    NECK: Neck appearance was normal. There were no neck masses and the thyroid was not enlarged.  RESPIRATORY: Bilaterally with no crackles, wheezing or rhonchi  CARDIOVASCULAR: Regular S1 and S2.  Radial pulses intact.  No edema.  GASTROINTESTINAL: NABS, soft, NT, ND, no HSM  MUSCULOSKELETAL: Skeletal configuration was normal and muscle mass was normal for age. Joint appearance was overall normal.  SKIN/HAIR/NAILS: Skin color was normal.  There were no skin lesions.  Hair and nails were normal.  NEUROLOGIC: Alert and oriented to person, place, time, and circumstance. Speech was normal. Motor strength was normal for age   PSYCHIATRIC:  Mood and affect were normal.  Appears anxious in respect to her sleep specifically.      I spent a total of 25 minutes face-to-face with Fabi Smith during today's office visit.       "

## 2021-06-14 NOTE — PATIENT INSTRUCTIONS - HE
Check blood pressure 1-2 times a week. If over > 140/90, please follow up with me.    Increase your mirtazapine to 30mg daily    Increase your buspirone as follows:   o Take 5mg in the morning and 10 mg at nighttime for a week; THEN  o Take 10mg in the morning and 10mg at nighttime      GENERAL INFORMATION AND HELPFUL NUMBERS:    If labs were ordered today, please go to the outpatient lab located in the same building. Once the results are back, we will notify you with results.    If imaging was ordered to be done today, please go to [facility] (located in the same building across our lobby). Once the results are back, we will notify you with results.    If imaging was ordered to be done in the future at an [facility] facility, someone will call to schedule otherwise you may also call [phone] to schedule.    If a specialty referral was placed during today's visit, you should expect to hear from someone to schedule. If you do not hear from anyone within 7 days, please call the specialty scheduling number at [phone]. It is an answering machine, so you will leave a voicemail on the line and someone should return your voicemail in approximately 24 hours or so.     If a mammogram was ordered during today's visit, someone will call to schedule otherwise you may also call [phone] to schedule     If a heart ultrasound or stress test was ordered today, someone will call to schedule otherwise you may also call the main Cardiology clinic at [phone] to schedule.    If a bone density scan was ordered today, someone will call to schedule otherwise you may also call [phone] to schedule.    If you have any questions or concerns after our visit today, please message me via [employer] or you may also call [phone]. You will reach the call center, at which time you can ask to leave a message or question for me. Either myself or a support staff member from my team will then reach out to you to discuss  your questions or concerns.

## 2021-06-14 NOTE — TELEPHONE ENCOUNTER
Please let patient know it is okay to restart the mirtazapine at 15 mg mg with stopping the Lexapro.  Ideally, if she has been on the lexapro for over 3 weeks, it would be best if she tapered the Lexapro by cutting it down to 5 mg for 1 week and then completely stopping it.  There is no issue with melatonin and taking Coumadin.  How much melatonin is she intending on taking. I would recommend 3 mg at bedtime.   How would she like to proceed?

## 2021-06-14 NOTE — PROGRESS NOTES
[facility]  Internal Medicine - Office Visit    Patient: Fabi Smith   MRN: [id number]   Date of Service: 02/05/21   Patient Care Team:  Renita Garcia MD as PCP - General (Pediatrics)  Sadi Willis MD as Physician (Oncology)  Jeremias Reid MD as Assigned PCP    ASSESSMENT/PLAN     Fabi Smith is here to establish care:    Diagnoses and all orders for this visit:    Benign essential hypertension  She reports that she is compliant with her atenolol, hydrochlorothiazide, and losartan.  She does not check her blood pressure at home due to concerns that it will make her anxiety worse.  She does have a blood pressure cuff at home.  Today her blood pressure is above goal at 154/70.  This could just be whitecoat hypertension.  I have asked her to check her blood pressure 1-2 times a week.  If over 140/90, please follow-up with me.  She did recently see her oncologist who did blood work.  Her blood counts and BMP were normal.  For this reason, we will not recheck her electrolytes.  We will recheck in 3 months when she returns.    Depression, unspecified depression type  She continues to struggle with depression and anxiety.  The Remeron she is taking just 15 mg daily.  We will increase this to 30 mg at bedtime.  PHQ 9 score of 7 today, no suicidal ideation.  -     mirtazapine (REMERON) 15 MG tablet; Take 2 tablets (30 mg total) by mouth at bedtime.    Anxiety  SHAVON-7 score of 13 today.  She does not feel her anxiety is under good control.  She was recently started on buspirone.  She is on just 10 mg at nighttime.  We will increase her dose slowly over the span of 2 weeks so that she reaches goal of 10 mg twice a day.  For the next week, she will take an additional 5 mg in the morning and 10 mg at night, and then increase to 10 mg in the morning and 10 mg at nighttime thereafter.  We will have her return in 3 months time.  She is not interested in seeing psychiatry  at this time.  -     busPIRone (BUSPAR) 10 MG tablet; Take 1 tablet (10 mg total) by mouth 2 (two) times a day.  Dispense: 30 tablet; Refill: 2    Coagulation factor deficiency syndrome (H)  History of DVT (deep vein thrombosis)  She reports a history of protein C deficiency and has had DVTs.  She is on warfarin indefinitely.    Chronic insomnia  We will increase her Remeron per above.    Malignant neoplasm of right female breast, unspecified estrogen receptor status, unspecified site of breast (H)  She has a history of breast cancer status postmastectomy.  She follows with oncology once a year.    Mixed hyperlipidemia  She is on a statin    Palpitations  History of palpitations.  She has not had a for quite some time now.  We will continue to monitor.  If she continues to have palpitations, she is to let me know and we can do a heart monitor to work-up.      Return to clinic in 3 months for follow up.    Renita Garcia MD  Internal Medicine and Pediatrics  [facility]    SUBJECTIVE     Fabi Smith is here to establish care.    Lives alone in a house.  had Parkinson's' and  from that. Anxiety really hit her about a year ago. Independent of ADL's. Walking 2 miles per day. She is cross country skiing. She has 4 children all in [place] and [facility]. 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    She has anxiety. She has a history of SHAVON since . Had been on prozac on and off over the years. Recently started on lexapro, made her have tremors and bad insomnia. Got off after a month, now on mirtazapine and melatonin. Also on buspirone for anxiety, but not sure if it's really helpful. Has seen psychiatry in the past.    Protein C deficiency, on warfarin indefinitely. Hx of DVT and PE.    Once in a while in the past, feels irregular heart beat. Sometimes really fast. Went away ultimately. Tends to happen when lies in  bed. No issues with exercise.     Hx of bilateral mastectomy. Hx of breast cancer. Sees oncology once a year.    Has had colonic polyps, 2020 colonoscopy last one and told not needed to go back for anymore colonoscopies.     Has a uterine fibroid.    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    She has hypertension. Doesn't check her blood pressure at home because she worries too much it's high. Sometimes misses dose of atenolol. Takes losartan and hydrochlorothiazide daily.     She has lipomas on body.       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     Baker's cyst of knee, left 02/18/2016     Left leg pain 02/18/2016     Acquired bilateral flat feet 02/18/2016     Coagulation factor deficiency syndrome (H) 02/18/2016     Breast cancer, female, right 02/18/2016     Hyperlipidemia 12/09/2015     Depression 12/09/2015       Past Medical History:   Diagnosis Date     DVT (deep venous thrombosis) (H)      Hyperlipidemia      PE (pulmonary embolism)        Past Surgical History:   Procedure Laterality Date     APPENDECTOMY       BREAST RECONSTRUCTION       MASTECTOMY  2001    for cancer dx in 1 breast     TONSILLECTOMY AND ADENOIDECTOMY       WRIST GANGLION EXCISION         Current Outpatient Medications   Medication Sig Dispense Refill     atenoloL (TENORMIN) 25 MG tablet TAKE 1 TABLET (25 MG) BY MOUTH ONCE A DAY 90 tablet 3      "atorvastatin (LIPITOR) 40 MG tablet Take 1 tablet (40 mg) by mouth once daily. 90 tablet 3     busPIRone (BUSPAR) 10 MG tablet Take 1 tablet (10 mg total) by mouth 2 (two) times a day. 30 tablet 2     hydroCHLOROthiazide (HYDRODIURIL) 25 MG tablet TAKE 1 TABLET BY MOUTH ONCE DAILY 90 tablet 3     loperamide (IMODIUM) 2 mg capsule Take 2 mg by mouth as needed.        losartan (COZAAR) 100 MG tablet TAKE 1 TABLET  BY MOUTH ONCE A DAY 90 tablet 3     melatonin (MELATIN) 3 mg Tab tablet Take 6 mg by mouth at bedtime as needed.       mirtazapine (REMERON) 15 MG tablet Take 2 tablets (30 mg total) by mouth at bedtime.       SACCHAROMYCES BOULARDII (PROBIOTIC, S.BOULARDII, ORAL) Take by mouth.       warfarin ANTICOAGULANT (COUMADIN/JANTOVEN) 1 MG tablet TAKE 1 TABLET (1 MG) BY MOUTH ONCE DAILY. TAKE WITH 4 MG TABLET FOR TOTAL DAILY DOSE OF 5MG. ADJUST DOSE BASED ON INR RESULTS AS DIRECTED. 90 tablet 0     warfarin ANTICOAGULANT (COUMADIN/JANTOVEN) 4 MG tablet TAKE 1 TABLET BY MOUTH ONCE DAILY. (TAKE WITH 1 MG= 5mg daily)  ADJUST DOSE BASED ON INR RESULTS AS DIRECTED. 90 tablet 0     No current facility-administered medications for this visit.        No Known Allergies    Family History   Problem Relation Age of Onset     Uterine cancer Mother         passed     Thyroid cancer Father         passed     No Medical Problems Brother      Clotting disorder Brother        Social History     Tobacco Use     Smoking status: Former Smoker     Packs/day: 0.50     Years: 30.00     Pack years: 15.00     Types: Cigarettes     Quit date: 1985     Years since quittin.9     Smokeless tobacco: Never Used     Tobacco comment: quit age 46   Substance Use Topics     Alcohol use: No     Comment: hx of alcoholism, treated and sober since         Social History     Substance and Sexual Activity   Drug Use No         OBJECTIVE           /70   Ht 5' 3\" (1.6 m)   Wt 158 lb 9.6 oz (71.9 kg)   LMP  (LMP Unknown)   BMI 28.09 " kg/m    Physical Exam      GENERAL APPEARANCE: healthy, alert and no distress     RESP: lungs clear to auscultation - no rales, rhonchi or wheezes     CV: regular rates and rhythm, normal S1 S2, no S3 or S4 and no murmur, click or rub     ABDOMEN:  soft, nontender, no HSM or masses and bowel sounds normal     NEURO: Normal strength and tone, sensory exam grossly normal, mentation intact and speech normal     PSYCH: mentation appears normal. and affect normal/bright    Labs/imaging/studies:  See above

## 2021-06-14 NOTE — TELEPHONE ENCOUNTER
Triage call:    Being treated for insomnia, but is experiencing extreme inability to sleep x 48 hours.    Trouble sleeping several months, has been using prescribed med mitrazapine and melatonin. Now is experiencing worsening insomnia--3-4 hours per night.     Did not have sleeping problems before.  Denies anxiety or depression. Walks 2 miles a day. Feels comfortable at bedtime, but cannot sleep. Denies stress or anxiety. Denies caffeine use,  Denies alcohol use. Denies difficulty breathing. Does not have a TV in the bedroom. Is trying everything she can to sleep without results.     According to protocol, patient should be seen in the next 3 days. Transferred to scheduling for appointment.     Lottie Jamil RN PHN MAEd, MAN Nurse Educator 1/20/2021 9:44 AM    COVID 19 Nurse Triage Plan/Patient Instructions    Please be aware that novel coronavirus (COVID-19) may be circulating in the community. If you develop symptoms such as fever, cough, or SOB or if you have concerns about the presence of another infection including coronavirus (COVID-19), please contact your health care provider or visit [email or web address].     Disposition/Instructions    In-Person Visit with provider recommended. Reference Visit Selection Guide.    Thank you for taking steps to prevent the spread of this virus.  o Limit your contact with others.  o Wear a simple mask to cover your cough.  o Wash your hands well and often.    Resources    [facility]: About COVID-19: [email or web address]    CDC: What to Do If You're Sick: www.cdc.gov/coronavirus/2019-ncov/about/steps-when-sick.html    CDC: Ending Home Isolation: www.cdc.gov/coronavirus/2019-ncov/hcp/disposition-in-home-patients.html     CDC: Caring for Someone: www.cdc.gov/coronavirus/2019-ncov/if-you-are-sick/care-for-someone.html     [facility]: Interim Guidance for Hospital Discharge to Home: www.health.[facility].[place]/diseases/coronavirus/hcp/hospdischarge.pdf    [facility]  clinical trials (COVID-19 research studies): clinicalaffairs.[facility]/n-clinical-trials     Below are the COVID-19 hotlines at the [place] Department of Health ([facility]). Interpreters are available.   o For health questions: Call 491-502-4293 or 1-152.339.7445 (7 a.m. to 7 p.m.)  o For questions about schools and childcare: Call 062-966-6669 or 1-449.254.6082 (7 a.m. to 7 p.m.)     Reason for Disposition    Patient wants to be seen    Additional Information    Negative: Difficulty breathing    Negative: Depression is suspected    Negative: Traumatic Brain Injury (TBI) is suspected    Negative: Alcohol abuse or dependence is suspected    Negative: Substance abuse or dependence suspected    Negative: Pain is causing insomnia and pain is not a chronic symptom (recurrent or ongoing AND present > 4 weeks)    Negative: Insomnia persists > 1 week and following Insomnia Care Advice    Negative: Insomnia interferes with work or school    Negative: Pain is causing insomnia and pain is a chronic symptom (recurrent or ongoing AND present > 4 weeks)    Protocols used: INSOMNIA-A-OH

## 2021-06-14 NOTE — TELEPHONE ENCOUNTER
"Pt calling back in regards to message below. Pt states \"I still can't sleep\".     Pt would like a call back today.   "

## 2021-06-14 NOTE — TELEPHONE ENCOUNTER
The complaint about not being able to sleep was communicated a day after the patient was a started on trazodone.  DHE actually try the trazodone and as she tried it since over the weekend?  From my chart review, I do not see any clear documentation that she has long QTc syndrome.  If she is concerned, only using 25 mg (0.5 tab) would be an option, or we can talk about alternatives and another appointment.  Thank you

## 2021-06-14 NOTE — TELEPHONE ENCOUNTER
Called and LVM for pt -     Can try to use .5 tab or schedule a video visit to discuss alternatives.    Please assist as needed.

## 2021-06-15 NOTE — PROGRESS NOTES
OFFICE VISIT NOTE    Subjective:   Chief Complaint:  Follow-up (depression and anxiety. has been off prozac for 1 1/2 years. is feeling the anxiety and depression is creeping back.)    77-year-old woman with a past history of depression and generalized anxiety disorder.  In the past she did very well with Prozac but stopped this medication about a year ago.  Lately she is having increasing difficulty with anxiety and some depression.  Somewhat stands with her daughter having a diagnosis of breast cancer.  Think she should probably get back on the medication Prozac which worked quite well for her in the past    Current Outpatient Prescriptions   Medication Sig     atenolol (TENORMIN) 25 MG tablet TAKE 1 TABLET BY MOUTH DAILY     atorvastatin (LIPITOR) 40 MG tablet TAKE 1 TABLET BY MOUTH DAILY     hydroCHLOROthiazide (HYDRODIURIL) 25 MG tablet TAKE 1 TABLET BY MOUTH DAILY     loperamide (IMODIUM) 2 mg capsule Take 2 mg by mouth as needed.      losartan (COZAAR) 100 MG tablet TAKE 1 TABLET BY MOUTH DAILY (Patient taking differently: TAKE PRN)     SACCHAROMYCES BOULARDII (PROBIOTIC, S.BOULARDII, ORAL) Take by mouth.     warfarin (COUMADIN) 1 MG tablet Take 1 mg by mouth daily. 5 mg by mouth daily. Adjust dose based on INR results as directed.     warfarin (COUMADIN) 4 MG tablet Take 4 mg by mouth daily. 5 mg daily Adjust dose based on INR results as directed.     ALPRAZolam (XANAX) 0.25 MG tablet Take 1 tablet (0.25 mg total) by mouth 3 (three) times a day as needed for anxiety.     FLUoxetine (PROZAC) 10 MG capsule Take 1 capsule daily for 7 days then increase to 2 capsules daily.       PSFHx: Tobacco Status:  She  reports that she quit smoking about 32 years ago. Her smoking use included Cigarettes. She has a 15.00 pack-year smoking history. She has never used smokeless tobacco.    Review of Systems:  A comprehensive review of systems is negative except for the comments above    Objective:    LMP  (LMP  Unknown)  GENERAL: No acute distress.  Is in no distress.  She is alert answers questions appropriately.  No signs of any psychosis.  Blood pressures 128/74.  Pulse is 56 and regular.  Shows a regular rhythm without ectopic beats.  PH is normal.  Memory is intact.  All questions were answered appropriately.  No signs of any cognitive dysfunction.    Assessment & Plan   Fabi Smith is a 77 y.o. female.    Pressure with some associated anxiety.  I think she should do well back on Prozac.  Prozac 10 mg for 7 days should be tried then increase to 20 mg daily which is the dose that she took in the past.  30 minutes was spent with this patient almost all of which was counseling with coordination of medication therapy.  We discussed side effects, expectations, time he takes for the medication to work etc.    Diagnoses and all orders for this visit:    Depression  -     FLUoxetine (PROZAC) 10 MG capsule; Take 1 capsule daily for 7 days then increase to 2 capsules daily.  Dispense: 60 capsule; Refill: 2    Anxiety        The following high BMI interventions were performed this visit: encouragement to exercise    Saad Marcelo MD  Transcription using voice recognition software, may contain typographical errors.

## 2021-06-15 NOTE — PROGRESS NOTES
"OFFICE VISIT NOTE    Subjective:   Chief Complaint:  Irregular Heart Beat (X 2 weeks)    77-year-old woman is complained of having palpitations for the last 2 weeks.  There is no associated chest pain shortness of breath or near syncope.  No new medications or medication doses changes.  Some stress in her life is her daughter was recently diagnosed with cancer.  She seems to sleep fairly well.  She drinks 1 cup of coffee a day.  Takes her medications daily as prescribed.    Current Outpatient Prescriptions   Medication Sig     ALPRAZolam (XANAX) 0.25 MG tablet Take 1 tablet (0.25 mg total) by mouth 3 (three) times a day as needed for anxiety.     atenolol (TENORMIN) 25 MG tablet TAKE 1 TABLET BY MOUTH DAILY     atorvastatin (LIPITOR) 40 MG tablet TAKE 1 TABLET BY MOUTH DAILY     hydroCHLOROthiazide (HYDRODIURIL) 25 MG tablet TAKE 1 TABLET BY MOUTH DAILY     losartan (COZAAR) 100 MG tablet TAKE 1 TABLET BY MOUTH DAILY (Patient taking differently: TAKE PRN)     SACCHAROMYCES BOULARDII (PROBIOTIC, S.BOULARDII, ORAL) Take by mouth.     warfarin (COUMADIN) 1 MG tablet Take 1 mg by mouth daily. 5 mg by mouth daily. Adjust dose based on INR results as directed.     warfarin (COUMADIN) 4 MG tablet Take 4 mg by mouth daily. 5 mg daily Adjust dose based on INR results as directed.     FLUoxetine (PROZAC) 10 MG capsule TAKE 1 CAPSULE BY MOUTH DAILY FOR 7 DAYS. INCREASE TO 2 CAPSULES DAILY     loperamide (IMODIUM) 2 mg capsule Take 2 mg by mouth as needed.        PSFHx: Tobacco Status:  She  reports that she quit smoking about 32 years ago. Her smoking use included Cigarettes. She has a 15.00 pack-year smoking history. She has never used smokeless tobacco.    Review of Systems:  A comprehensive review of systems is negative except for the comments above    Objective:    Pulse (!) 51  Ht 5' 4\" (1.626 m)  Wt 152 lb (68.9 kg)  LMP  (LMP Unknown)  SpO2 98%  BMI 26.09 kg/m2  GENERAL: No acute distress.  Blood pressure is " 116/74.  Pulse was 50.  She has occasional PVCs.  No edema.  No JVD.  Lungs are clear.  Heart shows a sinus bradycardia.  There are about 6-8 premature ventricular beats per minute.  There are no murmurs heard.  Pedal pulses normal.  The abdomen is without masses or any organomegaly.  Thyroid is not enlarged.    Assessment & Plan   Fabi Smith is a 77 y.o. female.    Pa[pitations..  She also has a significant bradycardia.  Will check a TSH as well as BMP.  Get an echocardiogram.  I think it will probably wind up stopping the atenolol since she has a significant bradycardia and blood pressure is low she probably does not need that for blood pressure.  We will see what the echocardiogram looks like.    Diagnoses and all orders for this visit:    Irregular heart rate  -     Electrocardiogram Perform and Read    Bradycardia            Saad Marcelo MD  Transcription using voice recognition software, may contain typographical errors.

## 2021-06-16 ENCOUNTER — OFFICE VISIT - HEALTHEAST (OUTPATIENT)
Dept: BEHAVIORAL HEALTH | Facility: CLINIC | Age: 81
End: 2021-06-16

## 2021-06-16 DIAGNOSIS — F41.8 OTHER SPECIFIED ANXIETY DISORDERS: ICD-10-CM

## 2021-06-16 PROBLEM — I10 BENIGN ESSENTIAL HYPERTENSION: Chronic | Status: ACTIVE | Noted: 2018-07-18

## 2021-06-16 PROBLEM — R00.2 PALPITATIONS: Status: ACTIVE | Noted: 2019-08-20

## 2021-06-16 PROBLEM — Z85.3 HISTORY OF MALIGNANT NEOPLASM OF FEMALE BREAST: Status: ACTIVE | Noted: 2019-08-20

## 2021-06-16 PROBLEM — F41.9 ANXIETY: Status: ACTIVE | Noted: 2021-02-05

## 2021-06-16 PROBLEM — S82.851A CLOSED TRIMALLEOLAR FRACTURE OF RIGHT ANKLE: Status: ACTIVE | Noted: 2019-08-20

## 2021-06-16 PROBLEM — F51.04 CHRONIC INSOMNIA: Chronic | Status: ACTIVE | Noted: 2019-10-14

## 2021-06-16 PROBLEM — Z79.01 ANTICOAGULANT LONG-TERM USE: Status: ACTIVE | Noted: 2021-02-03

## 2021-06-16 PROBLEM — Z86.718 HISTORY OF DVT (DEEP VEIN THROMBOSIS): Status: ACTIVE | Noted: 2019-08-20

## 2021-06-16 ASSESSMENT — ANXIETY QUESTIONNAIRES
3. WORRYING TOO MUCH ABOUT DIFFERENT THINGS: NEARLY EVERY DAY
7. FEELING AFRAID AS IF SOMETHING AWFUL MIGHT HAPPEN: MORE THAN HALF THE DAYS
1. FEELING NERVOUS, ANXIOUS, OR ON EDGE: MORE THAN HALF THE DAYS
6. BECOMING EASILY ANNOYED OR IRRITABLE: MORE THAN HALF THE DAYS
5. BEING SO RESTLESS THAT IT IS HARD TO SIT STILL: NOT AT ALL
GAD7 TOTAL SCORE: 13
2. NOT BEING ABLE TO STOP OR CONTROL WORRYING: NEARLY EVERY DAY
IF YOU CHECKED OFF ANY PROBLEMS ON THIS QUESTIONNAIRE, HOW DIFFICULT HAVE THESE PROBLEMS MADE IT FOR YOU TO DO YOUR WORK, TAKE CARE OF THINGS AT HOME, OR GET ALONG WITH OTHER PEOPLE: VERY DIFFICULT
4. TROUBLE RELAXING: SEVERAL DAYS

## 2021-06-16 ASSESSMENT — PATIENT HEALTH QUESTIONNAIRE - PHQ9: SUM OF ALL RESPONSES TO PHQ QUESTIONS 1-9: 5

## 2021-06-16 ASSESSMENT — MIFFLIN-ST. JEOR: SCORE: 1127.21

## 2021-06-16 NOTE — TELEPHONE ENCOUNTER
She wasn't having any shortness of breath when I last saw her or when she was in the ED.     If very mild shortness of breath and has been going on for a long time and nothing new, then she can schedule an office visit to discuss this in the next few days. If more short of breath or any chest pain/lightheadedness/palpitations, needs to go to the ED.    For BP medications, she doesn't have to split them up; just needs to make sure she's taking her BP as scheduled and not miss any doses.    Dr. Fontana

## 2021-06-16 NOTE — PATIENT INSTRUCTIONS - HE
Increase amlodipine to 10mg daily.    Increase your mirtazapine from 15mg daily to 30mg daily.    Call the mental health clinic yourself to schedule. Their number is [phone]. If you are still having trouble scheduling, plese contact our clinic to assist you.    GENERAL INFORMATION AND HELPFUL NUMBERS:    If labs were ordered today, please go to the outpatient lab located in the same building. Once the results are back, we will notify you with results.    If imaging was ordered to be done today, please go to [facility] (located in the same building across our lobby). Once the results are back, we will notify you with results.    If imaging was ordered to be done in the future at an [facility] facility, someone will call to schedule otherwise you may also call [phone] to schedule.    If a specialty referral was placed during today's visit, you should expect to hear from someone to schedule. If you do not hear from anyone within 7 days, please call the specialty scheduling number at [phone]. It is an answering machine, so you will leave a voicemail on the line and someone should return your voicemail in approximately 24 hours or so.     If a mammogram was ordered during today's visit, someone will call to schedule otherwise you may also call [phone] to schedule     If a heart ultrasound or stress test was ordered today, someone will call to schedule otherwise you may also call the main Cardiology clinic at [phone] to schedule.    If a bone density scan was ordered today, someone will call to schedule otherwise you may also call [phone] to schedule.    If you have any questions or concerns after our visit today, please message me via [employer] or you may also call [phone]. You will reach the call center, at which time you can ask to leave a message or question for me. Either myself or a support staff member from my team will then reach out to you to discuss your questions or  concerns.

## 2021-06-16 NOTE — TELEPHONE ENCOUNTER
Called to pt and confirmed that this shortness of breath has been going on for sometime.      Pt states that she was more concerned about taking BP meds at different times because she thinks this will help keep BP down.      Informed that she should continue to take BP med in the AM and monitor BP's - keep a log- she will then bring to appt to discuss need to take seperate doses of the med w/ PCP but not to change anything at this time.    Pt was also informed of Symptoms of shortness of breath that she should go to ED     Pt is scheduled and no further needed.

## 2021-06-16 NOTE — TELEPHONE ENCOUNTER
Pt RX has been increased to 10 mg    Would like to know if she can break up the medication and take in the morning and night instead all at once.     She read an article that it was good break up BP medication and take in the AM and PM.    Best Number to reach patient is     Asked pt if there was anything else, she began to discuss muscle pain,   muscle tension questions, not sure if it muscle pain or angina.  Transferred call to Triage.

## 2021-06-16 NOTE — TELEPHONE ENCOUNTER
Went to urgent care and BP was 244/115 yesterday and they told her to see her pcp today.    She is too afraid to take it today.  Lots on her mind with covid and being alone. She is a  of 11 years.    Former [facility] patient.      Unable to triage now as Bp now unknown. No blurred vision now or HA.    Walker and regular exerciser.    No HA or blurred vision today.  Did not sleep well last night. Warm transferred to [facility]    Would like to see Dr. Fontana at [place]. Full till Mid April so going to [facility] clinic seeiing  this AM.    Roxie Zhang, RN [facility] Triage Nurse Advisor    Additional Information    Negative: Sounds like a life-threatening emergency to the triager    Negative: Pregnant > 20 weeks or postpartum (< 6 weeks after delivery) and new hand or face swelling    Negative: Pregnant > 20 weeks and BP > 140/90    Systolic BP >= 160 OR Diastolic >= 100, and any cardiac or neurologic symptoms (e.g., chest pain, difficulty breathing, unsteady gait, blurred vision)    Protocols used: HIGH BLOOD PRESSURE-A-OH

## 2021-06-16 NOTE — TELEPHONE ENCOUNTER
Instructions       Return in about 2 weeks (around 4/26/2021) for Next scheduled follow up.    Increase amlodipine to 10mg daily.    Increase your mirtazapine from 15mg daily to 30mg daily.    Call the mental health clinic yourself to schedule. Their number is [phone]. If you are still having trouble scheduling, plese contact our clinic to assist you.          Pt is scheduled for OV on 04/28/21    Please advise

## 2021-06-16 NOTE — TELEPHONE ENCOUNTER
Patient calls with multiple concerns. She reports that she has recently been seen in the ED and clinic in regards to high blood pressure. Patient is currently taking all of her medication in the morning. She is wondering if there is any benefit to splitting her medication and taking some at night?    Patient also has concerns regarding muscle tiredness and shortness of breath. Patient reports that for the past few months she has noticed that when she is doing something like shoveling, cooking, etc. She gets mildly short of breath and has what she calls muscle fatigue. Patient says that it isn't really pain, but she feels it in her mid back down to her waist and neck. After she sits and rests it goes away. Patient did have one time recently where she felt that she had heartburn, but it went away after she drank some cold water. Patient reports that she does have a lot of stress right now.    Patient is advised that she should be seen today for evaluation. Patient is hesitant since she recently was seen in the ED and clinic. She reports that her blood work and EKG a week ago were good. Patient would prefer a message be sent to her provider to advise on recommendations. Patient is advised that if she does not hear back from her provider or if symptoms worsen she should be seen.    Please review and provide recommendations regarding symptoms and medications.    Disha Argueta RN, BSN Nurse Triage Advisor 4:01 PM 4/19/2021        Reason for Disposition    MILD difficulty breathing (e.g., minimal/no SOB at rest, SOB with walking, pulse < 100) of new onset or worse than normal    Additional Information    Negative: Breathing stopped and hasn't returned    Negative: Choking on something    Negative: SEVERE difficulty breathing (e.g., struggling for each breath, speaks in single words, pulse > 120)    Negative: Bluish (or gray) lips or face    Negative: Difficult to awaken or acting confused (e.g., disoriented, slurred  "speech)    Negative: Passed out (i.e., fainted, collapsed and was not responding)    Negative: Wheezing started suddenly after medicine, an allergic food, or bee sting    Negative: Stridor    Negative: Slow, shallow and weak breathing    Negative: Sounds like a life-threatening emergency to the triager    Negative: Chest pain    Negative: Wheezing (high pitched whistling sound) and previous asthma attacks or use of asthma medicines    Negative: Difficulty breathing and only present when coughing    Negative: Difficulty breathing and only from stuffy or runny nose    Negative: MODERATE difficulty breathing (e.g., speaks in phrases, SOB even at rest, pulse 100-120) of new onset or worse than normal    Negative: Wheezing can be heard across the room    Negative: Drooling or spitting out saliva (because can't swallow)    Negative: Any history of prior \"blood clot\" in leg or lungs    Negative: Recent illness requiring prolonged bedrest (i.e., immobilization)    Negative: Hip or leg fracture in past 2 months (e.g., or had cast on leg or ankle)    Negative: Major surgery in the past month    Negative: Recent long-distance travel with prolonged time in car, bus, plane, or train (i.e., within past 2 weeks; 6 or  more hours duration)    Negative: Extra heart beats OR irregular heart beating   (i.e., \"palpitations\")    Negative: Fever > 103 F (39.4 C)    Negative: Fever > 101 F (38.3 C) and over 60 years of age    Negative: Fever > 100.0 F (37.8 C) and bedridden (e.g., nursing home patient, stroke, chronic illness, recovering from surgery)    Negative: Fever > 100.0 F (37.8 C) and diabetes mellitus or weak immune system (e.g., HIV positive, cancer chemo, splenectomy, organ transplant, chronic steroids)    Negative: Periods where breathing stops and then resumes normally and bedridden (e.g., nursing home patient, CVA)    Negative: Pregnant or postpartum (from 0 to 6 weeks after delivery)    Negative: Patient sounds very sick or " weak to the triager    Protocols used: BREATHING DIFFICULTY-A-OH

## 2021-06-16 NOTE — TELEPHONE ENCOUNTER
Telephone Encounter by Mara gAee CMA at 3/8/2019  4:38 PM     Author: Mara Agee CMA Service: -- Author Type: Medical Assistant    Filed: 3/8/2019  4:38 PM Encounter Date: 3/8/2019 Status: Signed    : Mara Agee CMA (Medical Assistant)       Per the following message from Dr. Hardy:  Niels Hardy MD   You; [facility] Internal Medicine Support Pool 1 minute ago (4:35 PM)      Please refill 4  milligrams and 1 mg prescription      Dr. Hardy      Refill requests have been set up in a separate encounter for Dr. Hardy to review.  Mara PERRY CMA/FLORENCIO....................4:38 PM

## 2021-06-16 NOTE — TELEPHONE ENCOUNTER
Dr. Fontana    Patient said that she called yesterday and talked to someone and they told her that she didn't need any further action needed because she has an appt with you coming up, but she also said that she missed a call from someone and she wants to make sure that she still has her appt scheduled with you on 4/28/21.  She said that he has BP issues and she wants to make sure that someone is going to address her issues.  Please call patient back at  [phone] or at [phone]

## 2021-06-16 NOTE — PROGRESS NOTES
"    Assessment & Plan     Benign essential hypertension  Pressures are much better now that she started amlodipine, and off atenolol.  She is still taking her losartan and hydrochlorothiazide.  She is still not at goal and so I did advise that she increase her amlodipine to 10 mg daily.  She will follow up in 2 weeks with me. I do suspect a lot of this is driven by anxiety as she has never had such elevated BP's previously.  - amLODIPine (NORVASC) 5 MG tablet  Dispense: 30 tablet; Refill: 2    Anxiety  Depression, unspecified depression type  Buspirone was recently increased to 10 mg 3 times a day.  She reports that she is not doing 30 mg of Remeron as written in her chart, only 15 mg.  PHQ 9 score of 11, SHAVON-7 score of 15 today.  Seems that there are a lot of situational stressors in the home right now.  She did not seem to want to delve much into it.  We will increase her mirtazapine to 30 mg at nighttime. She was already referred to  clinic, but hasn't heard from them. Gave her number to call herself to schedule. F/u in 2 weeks.  - mirtazapine (REMERON) 15 MG tablet; Refill: 0    Return in about 2 weeks (around 4/26/2021) for Next scheduled follow up.    Renita Garcia MD  [facility]   Fabi Smith is 80 y.o. and presents today for the following health issues     HPI     She was seen for elevated BP last week. Started on amlodipine 5mg daily.  Atenolol was stopped because of bradycardia.  Has been checking her blood pressures.    167/97, 149/83, 139/76.   141/79. 131/71 yesterday.   149/88.     Insert visit last week, she did increase her buspirone to 10 mg 3 times a day.  She is not sure if it has helped.  She was referred to mental health, has not heard from anybody to schedule.  Has had some life stressors; may need to sell the home. \"Other things\" have happened, but she doesn't want to delve much into it. Constantly feeling in fear.     Review of " Systems  See above      Objective    /80 (Patient Site: Right Arm, Patient Position: Sitting)   Pulse 67   Wt 157 lb (71.2 kg)   LMP  (LMP Unknown)   SpO2 97%   BMI 27.81 kg/m    Body mass index is 27.81 kg/m .  Physical Exam  /80 (Patient Site: Right Arm, Patient Position: Sitting)   Pulse 67   Wt 157 lb (71.2 kg)   LMP  (LMP Unknown)   SpO2 97%   BMI 27.81 kg/m      General Appearance:    Alert, cooperative, no distress, appears stated age   Lungs:     Clear to auscultation bilaterally, respirations unlabored    Heart:    Regular rate and rhythm, S1 and S2 normal, no murmur, rub    or gallop   Extremities:   No edema    Psych:   Appears anxious   Neurologic:   CNII-XII intact, normal strength, sensation grossly

## 2021-06-16 NOTE — TELEPHONE ENCOUNTER
Patient calling this afternoon to report that her latest Rx for the Buspirone was not received by pharmacy ([facility]).  Please resend to [facility] so that patient may fill.  Patient reports she's got about one day left of meds at this time.    Any questions please call patient:  [phone]

## 2021-06-16 NOTE — PROGRESS NOTES
Assessment & Plan     Fabi was seen today for follow-up.    Diagnoses and all orders for this visit:    Benign essential hypertension  BP better than reported. Will add amlodipine 5 mg daily to her regimen. No lab today given lab at urgent care yesterday was unremarkable. No red flag symptoms.  She hasn't taken atenolol today and pulse already low so advised her to stop atenolol. F/u in 1-2 weeks with her pcp. Advised checking at home.   -     amLODIPine (NORVASC) 5 MG tablet; Take 1 tablet (5 mg total) by mouth daily.    Episode of recurrent major depressive disorder, unspecified depression episode severity (H)  Anxiety  Worsening. No SI/HI. Discussed increasing buspar to 10 mg three times a day. She's worried about mirtazapine increasing her BP however I advised she should keep taking for now. Advised therapy and she would like to receive counseling, referral placed. F/u with pcp in 1-2 weeks.   -     busPIRone (BUSPAR) 10 MG tablet; Take 1 tablet (10 mg total) by mouth 3 (three) times a day.  -     AMB REFERRAL TO MENTAL HEALTH AND ADDICTION  - Adult (18+); Outpatient Treatment; Individual/Couples/Family/Group Therapy/Health Psychology; [facility]; Any Clinic Location; We will contact you to schedule the appointment or plea...      Return in about 2 weeks (around 4/22/2021) for Recheck, anxiety, depression, Blood pressure.    Gabriella Swartz MD  [facility]    Subjective   Fabi Smith is 80 y.o. and presents today for the following health issues   HPI   Chief Complaint   Patient presents with     Follow-up     Was seen at urgent care yesterday for hypertension. Had EKG, UA and labs     Called nurse triage today     Went to urgent care and BP was 244/115 yesterday and they told her to see her pcp today.     She is too afraid to take it today.  Lots on her mind with covid and being alone. She is a  of 11 years.     Former [facility] patient.        Unable to triage now as Bp now unknown. No blurred vision now or HA.     Walker and regular exerciser.     No HA or blurred vision today.  Did not sleep well last night. Warm transferred to scheduling     Would like to see Dr. Fontana at [place]. Full till Mid April so going to [facility] seeiing  this AM.     Roxie Zhang RN [facility] Triage Nurse Advisor     Additional Information    Negative: Sounds like a life-threatening emergency to the triager    Negative: Pregnant > 20 weeks or postpartum (< 6 weeks after delivery) and new hand or face swelling    Negative: Pregnant > 20 weeks and BP > 140/90    Systolic BP >= 160 OR Diastolic >= 100, and any cardiac or neurologic symptoms (e.g., chest pain, difficulty breathing, unsteady gait, blurred vision)    Protocols used: HIGH BLOOD PRESSURE-A-OH                Here in clinic patient reports increasing anxiety. She's very anxious today. Not herself for the last 6 months due to anxiety and worries. She didn't tolerate Prozac or Lexapro prescribed by previous PCP. She stopped mirtazapine last night. She denies chest pain, shortness of breath, cough, dizziness, lightheadedness, blurry vision. When she gets anxious she has abdominal butterfly sensation that travels from her stomach all the way to her neck. covid has contributed to this as well.     Review of Systems  Negative except as above.       Objective    /87   Pulse (!) 51   Resp 20   Wt 158 lb 3.2 oz (71.8 kg)   LMP  (LMP Unknown)   BMI 28.02 kg/m    Body mass index is 28.02 kg/m .  Physical Exam  GENERAL: Healthy, alert and no distress  NEURO: Cranial nerves grossly intact. Mentation and speech appropriate for age.  PSYCH: Mentation appears normal, affect normal/bright, judgement and insight intact, normal speech and appearance well-groomed

## 2021-06-16 NOTE — TELEPHONE ENCOUNTER
busPIRone (BUSPAR) 10 MG tablet  10 mg, Oral, 3 times daily 4/8/2021 JUNIOR DUFFY T 90 tablet 1 ordered           Edit       Summary: Take 1 tablet (10 mg total) by mouth 3 (three) times a day., Starting Thu 4/8/2021, No Print   Dose, Frequency: 10 mg, 3 times daily  Ord/Sold: 4/8/2021 (O)  Report  Taking:   Long-term:   Pharmacy: [id number] - [address]   Med Dose History       Patient Sig: Take 1 tablet (10 mg total) by mouth 3 (three) times a day.       Ordered on: 4/8/2021        CASE:  No print.  Sending refill to pharmacy per protocol.    Refill Approved    Rx renewed per Medication Renewal Policy. Medication was last renewed on 2/3/21.    Mason Romero, [facility] Triage/Med Refill 4/15/2021     Requested Prescriptions   Pending Prescriptions Disp Refills     busPIRone (BUSPAR) 10 MG tablet [Pharmacy Med Name: busPIRone HCl Oral Tablet 10 MG] 30 tablet 0     Sig: TAKE 1 TABLET (10 MG) BY MOUTH ONCE A DAY AT BEDTIME       Tricyclics/Misc Antidepressant/Antianxiety Meds Refill Protocol Passed - 4/13/2021  7:37 PM        Passed - PCP or prescribing provider visit in last year     Last office visit with prescriber/PCP: 1/5/2021 Saad Marcelo MD OR same dept: 4/12/2021 Renita Garcia MD OR same specialty: 4/12/2021 Renita Garcia MD  Last physical: 10/22/2018 Last MTM visit: Visit date not found   Next visit within 3 mo: Visit date not found  Next physical within 3 mo: Visit date not found  Prescriber OR PCP: Saad Marcelo MD  Last diagnosis associated with med order: 1. Anxiety  - busPIRone (BUSPAR) 10 MG tablet [Pharmacy Med Name: busPIRone HCl Oral Tablet 10 MG]; TAKE 1 TABLET (10 MG) BY MOUTH ONCE A DAY AT BEDTIME  Dispense: 30 tablet; Refill: 0    If protocol passes may refill for 12 months if within 3 months of last provider visit (or a total of 15 months).

## 2021-06-17 NOTE — TELEPHONE ENCOUNTER
Dr. Marizol Crawley said that she had an echocardiogram done yesterday and it was abnormal.  She is wondering if she is ok to wait  Until May 11th to see a Cardiologist or does she need to get into one sooner.  She would like a call back at .

## 2021-06-17 NOTE — PROGRESS NOTES
"    Assessment & Plan     Benign essential hypertension  Well controlled on amlodipine, hydrochlorothiazide, and losartan. Labs are done in [facility] where she sees her hematologist. BMP and CBC normal in 4/2021.  - amLODIPine (NORVASC) 10 MG tablet  Dispense: 90 tablet; Refill: 3  - losartan (COZAAR) 100 MG tablet  Dispense: 90 tablet; Refill: 3    Heart murmur  Palpitations  Shortness of breath  Describing some vague symptoms of fatigue in \"core muscles\" with exertion in last months and maybe some shortness of breath. No chest pain. Also having occasional irregular palpitations. Heart murmur appreciated on exam today, she doesn't recall a hx of murmur. Echo in 2018 w/o any significant valvular disorders. EKG today showing NSR. Q waves in V1 and V2, T waves in lateral leads V4-V6 which aren't new compared to 2019. Recent BMP and CBC normal in 4/2021, recent TSH in 1/2021 normal. D/w her doing echo, heart monitor, and stress test given her symptoms. She is very anxious and wants to talk to cardiology for further testing, which I am in agreement with as today is my last day before maternity leave anyways and I think that would be best for follow up. I offered referral to our cardiology clinic; she sees her hematologist at the [facility] and wondering about seeing their cardiology group; however she wants to do some more research to see which cardiology group she'd ultimately like to see. After much discussion, she will accept the referral to our heart care clinic and will cancel if she finds another cardiology group she is more interested in seeing. If they require a referral, I advised that she call back our clinic and the covering provider can hopefully send in the referral to that specific location.  - Electrocardiogram Perform and Read  - Ambulatory referral to Cardiology - [facility]    Depression, unspecified depression type  Anxiety  PHQ9 score of 2, SHAVON 7 score of 14. She did not increase her mirtazapine from " "15 to 30mg like we discussed. Encouraged her to do so. She also is taking buspirone just as needed, at most only twice a day and sometimes none altogether. Encouraged her to take three times a day as she is extremely anxious. She's tried 3 different selective serotonin reuptake inhibitor's in past and not interested in starting another. D/w her starting SNRI, and she doesn't want to do that either. She has a therapy appt coming up in June. She'd like to stay the course and follow up in 4 months.  - mirtazapine (REMERON) 15 MG tablet  Dispense: 180 tablet; Refill: 3    Mixed hyperlipidemia  - atorvastatin (LIPITOR) 40 MG tablet  Dispense: 90 tablet; Refill: 3    40 mins spent on day of encounter for review of chart, patient visit, documentation.       Return in about 4 months (around 8/28/2021) for Recheck.    Renita Garcia MD  [facility]   Fabi Smith is 80 y.o. and presents today for the following health issues     HPI     Taking amlodipine 10mg daily as well as hydrochlorothiazide and losartan, and tolerating that well. BP usually 120/70's. Sometimes 150's in morning. Taking them all in the morning.    Taking the mirtazapine, but just 15 mg at nighttime.    Buspirone twice a day sometimes if she's feeling really anxious.    In past was on prozac, did well for years until she got older and then became agitated. Tried lexapro and zoloft, didn't do well.    Appointment with therapist scheduled in June, scheduling so far out.    A lot of her anxiety comes from situation-- her property.    Reports occasionally getting palpitations and feeling like her heart is irregular.    For last few months when she has been vacuuming or cooking for long time, she feels \"fatigue/weak\" in her stomach and back muscles. She has a hard time describing it, but thinks maybe short of breath too. She has no chest pain or lightheadedness.    She does walk, but doesn't do any intense " physical activity beyond walking.      Review of Systems  See above        Objective    /80 (Patient Site: Right Arm, Patient Position: Sitting, Cuff Size: Adult Regular)   Wt 159 lb 6.4 oz (72.3 kg)   LMP  (LMP Unknown)   BMI 28.24 kg/m    Body mass index is 28.24 kg/m .  Physical Exam  /80 (Patient Site: Right Arm, Patient Position: Sitting, Cuff Size: Adult Regular)   Wt 159 lb 6.4 oz (72.3 kg)   LMP  (LMP Unknown)   BMI 28.24 kg/m      General Appearance:    Alert, cooperative, no distress, appears stated age   Lungs:     Clear to auscultation bilaterally, respirations unlabored    Heart:    Regular rate and rhythm, S1 and S2 normal, grade 2 systolic murmur, no rub    or gallop   Extremities:   Extremities normal, atraumatic, no cyanosis or edema; non tender along palpation of spinous processes   Abdomen:   Soft, non distended   Psych:   Very anxious appearing   Neurologic:   CNII-XII intact, normal strength, sensation grossly

## 2021-06-17 NOTE — TELEPHONE ENCOUNTER
Pt states PCP increased almodipine to 10MG    She has run out, needs new RX to reflect the increase dose.    Refill Request  Did you contact pharmacy: Yes - needs new RX  Medication name:     Amlodipine 10MG - 90 day supply     Who prescribed the medication: PCP  [facility]  Is patient out of medication: Yes  Patient notified refills processed in 3 business days:  yes  Okay to leave a detailed message: yes      Last Office Visit with PCP = 04/12/2021

## 2021-06-17 NOTE — PATIENT INSTRUCTIONS - HE
Take the buspirone as prescribed three times a day.    Take the higher dose of mirtazapine.    I would like you to consider getting a heart ultrasound (echo), stress test, and heart monitor given your symptoms of shortness of breath, fatigue, and palpitations; you also have a heart murmur. I can refer you to our heart care clinic with [facility], if you change your mind and are interested in seeing them. If you want to do more research about which cardiology group to see, you can go ahead and do that and then call back into our clinic to let us know where you want the cardiology referral sent.    GENERAL INFORMATION AND HELPFUL NUMBERS:    If labs were ordered today, please go to the outpatient lab located in the same building. Once the results are back, we will notify you with results.    If imaging was ordered to be done today, please go to [facility] (located in the same building across our lobby). Once the results are back, we will notify you with results.    If imaging was ordered to be done in the future at an [facility] facility, someone will call to schedule otherwise you may also call [phone] to schedule.    If a specialty referral was placed during today's visit, you should expect to hear from someone to schedule. If you do not hear from anyone within 7 days, please call the specialty scheduling number at [phone]. It is an answering machine, so you will leave a voicemail on the line and someone should return your voicemail in approximately 24 hours or so.     If a mammogram was ordered during today's visit, someone will call to schedule otherwise you may also call [phone] to schedule     If a heart ultrasound or stress test was ordered today, someone will call to schedule otherwise you may also call the main Cardiology clinic at [phone] to schedule.    If a bone density scan was ordered today, someone will call to schedule otherwise you may also call [phone] to  schedule.    If you have any questions or concerns after our visit today, please message me via [employer] or you may also call [phone]. You will reach the call center, at which time you can ask to leave a message or question for me. Either myself or a support staff member from my team will then reach out to you to discuss your questions or concerns.

## 2021-06-17 NOTE — TELEPHONE ENCOUNTER
Patient calling to check on status of this message as she has not heard anything back yet.  She is going to be leaving the house for a little while soon, please call her cell phone at [phone].

## 2021-06-17 NOTE — TELEPHONE ENCOUNTER
Last Office Visit  4/12/2021 Renita Garcia MD  Notes:  Benign essential hypertension  Pressures are much better now that she started amlodipine, and off atenolol.  She is still taking her losartan and hydrochlorothiazide.  She is still not at goal and so I did advise that she increase her amlodipine to 10 mg daily.  She will follow up in 2 weeks with me. I do suspect a lot of this is driven by anxiety as she has never had such elevated BP's previously.  - amLODIPine (NORVASC) 5 MG tablet  Dispense: 30 tablet; Refill: 2     Anxiety  Depression, unspecified depression type  Buspirone was recently increased to 10 mg 3 times a day.  She reports that she is not doing 30 mg of Remeron as written in her chart, only 15 mg.  PHQ 9 score of 11, SHAVON-7 score of 15 today.  Seems that there are a lot of situational stressors in the home right now.  She did not seem to want to delve much into it.  We will increase her mirtazapine to 30 mg at nighttime. She was already referred to [facility] clinic, but hasn't heard from them. Gave her number to call herself to schedule. F/u in 2 weeks.  - mirtazapine (REMERON) 15 MG tablet; Refill: 0       Last Filled:  amLODIPine (NORVASC) 5 MG tablet 30 tablet 2 4/12/2021  No   Sig - Route: Take 2 tablets (10 mg total) by mouth daily. - Oral   Class: No Print       Next OV:  4/28/2021 Renita Garcia MD        Medication teed up for provider signature

## 2021-06-19 NOTE — LETTER
Letter by Saad Marcelo MD at      Author: Saad Marcelo MD Service: -- Author Type: --    Filed:  Encounter Date: 8/7/2019 Status: (Other)         Fabi Smith  [address] [place] [id number]             August 7, 2019         Dear Ms. Smith,    Below are the results from your recent visit:     EKG showed minor nonspecific changes.  The minor changes seen on EKG are nothing to be worried  about.  They can be caused by blood pressure, medication, electrolytes,etc. there are probably over 50  things that can cause minor changes.  Again, these have been present in the past.  Nothing serious at  all. No change from 2018.    Please call with questions or contact us using [employer].    Sincerely,        Electronically signed by Saad Marcelo MD

## 2021-06-19 NOTE — LETTER
Letter by Saad Marcelo MD at      Author: Saad Marcelo MD Service: -- Author Type: --    Filed:  Encounter Date: 7/30/2019 Status: (Other)         Fabi Smith  [address] [place] [id number]             July 30, 2019         Dear Ms. Smith,    Below are the results from your recent visit:    Resulted Orders   Basic Metabolic Panel   Result Value Ref Range    Sodium 140 136 - 145 mmol/L    Potassium 3.5 3.5 - 5.0 mmol/L    Chloride 103 98 - 107 mmol/L    CO2 25 22 - 31 mmol/L    Anion Gap, Calculation 12 5 - 18 mmol/L    Glucose 89 70 - 125 mg/dL    Calcium 10.4 8.5 - 10.5 mg/dL    BUN 12 8 - 28 mg/dL    Creatinine 0.75 0.60 - 1.10 mg/dL    GFR MDRD Af Amer >60 >60 mL/min/1.73m2    GFR MDRD Non Af Amer >60 >60 mL/min/1.73m2    Narrative    Fasting Glucose reference range is 70-99 mg/dL per  American Diabetes Association (ADA) guidelines.   Lipid Cascade   Result Value Ref Range    Cholesterol 266 (H) <=199 mg/dL    Triglycerides 166 (H) <=149 mg/dL    HDL Cholesterol 85 >=50 mg/dL    LDL Calculated 148 (H) <=129 mg/dL    Patient Fasting > 8hrs? Yes    Thyroid Cascade   Result Value Ref Range    TSH 0.77 0.30 - 5.00 uIU/mL   INR   Result Value Ref Range    INR 2.21 (H) 0.90 - 1.10    Narrative    INR Therapeutic Ranges:  Mech. Valve 2.5-3.5  Post Surg.  2.0-3.0  DVT/PE      2.0-3.0   Magnesium   Result Value Ref Range    Magnesium 1.9 1.8 - 2.6 mg/dL       Fabi, recent labs are reviewed.  Your electrolytes, blood sugar, and kidney functions, were all normal.  Lipid levels as expected are high, now that you are off of your statin medication.  LDL cholesterol, the bad variety, is at 148.  We like that well under 130.  TSH, which measures thyroid hormone levels, is normal.  Magnesium level was also normal.  Your INR was therapeutic at 2.21.    Please call with questions or contact us using [employer].    Sincerely,        Electronically signed by Saad Marcelo MD

## 2021-06-19 NOTE — PROGRESS NOTES
"OFFICE VISIT NOTE    Subjective:   Chief Complaint:  Foot Pain (right foot has a lump on the bottom that is painful with pressure)    78-year-old woman with a history of hypertension as well as past history of clotting disorder with recurrent DVTs.  She currently takes warfarin.  She is in a day since she has a painful lump on the bottom of the right foot.  No known injury.    Current Outpatient Prescriptions   Medication Sig     ALPRAZolam (XANAX) 0.25 MG tablet Take 1 tablet (0.25 mg total) by mouth 3 (three) times a day as needed for anxiety.     atenolol (TENORMIN) 25 MG tablet TAKE 1 TABLET BY MOUTH DAILY     atorvastatin (LIPITOR) 40 MG tablet TAKE 1 TABLET BY MOUTH DAILY     FLUoxetine (PROZAC) 20 MG capsule Take 20 mg by mouth daily.     hydroCHLOROthiazide (HYDRODIURIL) 25 MG tablet TAKE 1 TABLET BY MOUTH DAILY     loperamide (IMODIUM) 2 mg capsule Take 2 mg by mouth as needed.      losartan (COZAAR) 100 MG tablet TAKE 1 TABLET BY MOUTH DAILY (Patient taking differently: TAKE PRN)     SACCHAROMYCES BOULARDII (PROBIOTIC, S.BOULARDII, ORAL) Take by mouth.     warfarin (COUMADIN) 1 MG tablet Take 1 mg by mouth daily. 5 mg by mouth daily. Adjust dose based on INR results as directed.     warfarin (COUMADIN) 4 MG tablet Take 4 mg by mouth daily. 5 mg daily Adjust dose based on INR results as directed.       Review of Systems:  A comprehensive review of systems is negative except for the comments above    Objective:    Pulse (!) 40  Ht 5' 4\" (1.626 m)  Wt 155 lb (70.3 kg)  LMP  (LMP Unknown)  SpO2 96%  BMI 26.61 kg/m2  GENERAL: No acute distress.  Pressures 126/64.  Pulse is 64 with PACs.  On the bottom of the foot right side, she has a 8 mm diameter fibrous area that is slightly discolored and tender to touch.  I suspect there is some fibrinous.  She has a small hemorrhage in it.  No fluctuance.  No evidence of any surrounding cellulitis.  Arterial circulation is normal in the right lower extremity.  Left " foot is normal as well.    Assessment & Plan   Fabi Smith is a 78 y.o. female.    Right foot pain.  I suspect this is a area of small hemorrhage in the some fibrous tissue on the soles of the foot.  To avoid long walks for the next week.  She should wear a good pair of shoes at all times including when she is in the house.  I suspect by just avoiding simple trauma this will clear up.  Should it get larger she will require referral to podiatrist.  She should come in October for a physical exam.  Diagnoses and all orders for this visit:    Right foot pain    Benign essential hypertension        Saad Marcelo MD  Transcription using voice recognition software, may contain typographical errors.

## 2021-06-20 NOTE — LETTER
Letter by Saad Marcelo MD at      Author: Saad Marcelo MD Service: -- Author Type: --    Filed:  Encounter Date: 9/23/2020 Status: (Other)         Fabi Smith  [address] [place] [id number]             September 23, 2020         Dear Ms. Smith,    Below are the results from your recent visit:    Resulted Orders   Lipid Cascade   Result Value Ref Range    Cholesterol 218 (H) <=199 mg/dL    Triglycerides 100 <=149 mg/dL    HDL Cholesterol 79 >=50 mg/dL    LDL Calculated 119 <=129 mg/dL    Patient Fasting > 8hrs? Yes        Fabi, recent lipid profile showed improved cholesterol.  Bad cholesterol, LDL, has dropped down to 119.  I like to keep it under 130.  Your good cholesterol, HDL, is excellent at 79.    Please call with questions or contact us using [employer].    Sincerely,        Electronically signed by Saad Marcelo MD

## 2021-06-20 NOTE — LETTER
Letter by Saad Marcelo MD at      Author: Saad Marcelo MD Service: -- Author Type: --    Filed:  Encounter Date: 2020 Status: (Other)         To whom it may concern,   Fabi Smith,  36, fell and fractured her ankle on 19.She required surgery 3 days later.  She lives alone and was unable to bear weight to walk, so had to go to assisted living. She spent 2 months  there , and required wheel chair and use of adaptive equipment, while at the facility. Eventually,she recovered and was able to go home.     Saad Marcelo M.D.

## 2021-06-21 NOTE — PROGRESS NOTES
Preoperative Exam    Scheduled Procedure: Biopsy perianal area  Surgery Date:  10/25/18  Surgery Location: [facility]    Surgeon:  Dr. Bautista    Assessment/Plan:     1. Preop general physical exam  Patient is medically stable for anesthesia and biopsies this week.  - Hemoglobin  - Basic Metabolic Panel    2. DVT (deep venous thrombosis) (H)  Patient is 1 of several members in the family who has a genetic predisposition to clotting.  She is been on warfarin for several years.  No recent clots.  No bleeding on warfarin.  Patient has been instructed by surgery she may take warfarin up to the time of her biopsy.    3. Coagulation factor deficiency syndrome (H)  As above, genetic disposition to clotting    4. Anal lesion  Please see colorectal surgery note.  This is to be biopsied.  The lesion has been present about 6-7 months.  She says it has been shrinking the past 2 weeks.    5 hypertension.  Blood pressure well controlled.  Will take losartan and atenolol up to the day of surgery.    Surgical Procedure Risk: Low (reported cardiac risk generally < 1%)  Have you had prior anesthesia?: Yes  Have you or any family members had a previous anesthesia reaction:  No  Do you or any family members have a history of a clotting or bleeding disorder?: Yes: PE  Cardiac Risk Assessment: no increased risk for major cardiac complications    Patient approved for surgery with general or local anesthesia.        Functional Status: Independent  Patient plans to recover at home with family.     Subjective:      Fabi Smith is a 78 y.o. female who presents for a preoperative consultation.  He needs biopsy of the perianal lesion.  Medically she is been stable.  No infections.  No cardiopulmonary symptoms.    All other systems reviewed and are negative, other than those listed in the HPI.    Pertinent History  Do you have difficulty breathing or chest pain after walking up a flight of stairs: No  History of  obstructive sleep apnea: Yes: per patient no testing  Steroid use in the last 6 months: No  Frequent Aspirin/NSAID use: Yes: on warfarin   Prior Blood Transfusion: No  Prior Blood Transfusion Reaction: No  If for some reason prior to, during or after the procedure, if it is medically indicated, would you be willing to have a blood transfusion?:  There is no transfusion refusal.    Current Outpatient Prescriptions   Medication Sig Dispense Refill     atenolol (TENORMIN) 25 MG tablet TAKE 1 TABLET BY MOUTH DAILY 90 tablet 2     atorvastatin (LIPITOR) 40 MG tablet TAKE 1 TABLET BY MOUTH DAILY 90 tablet 2     FLUoxetine (PROZAC) 20 MG capsule Take 20 mg by mouth daily.       hydroCHLOROthiazide (HYDRODIURIL) 25 MG tablet TAKE 1 TABLET BY MOUTH DAILY 90 tablet 3     loperamide (IMODIUM) 2 mg capsule Take 2 mg by mouth as needed.        losartan (COZAAR) 100 MG tablet TAKE 1 TABLET BY MOUTH DAILY (Patient taking differently: TAKE PRN) 90 tablet 3     SACCHAROMYCES BOULARDII (PROBIOTIC, S.BOULARDII, ORAL) Take by mouth.       warfarin (COUMADIN) 1 MG tablet Take 1 mg by mouth daily. 5 mg by mouth daily. Adjust dose based on INR results as directed.       warfarin (COUMADIN) 4 MG tablet Take 4 mg by mouth daily. 5 mg daily Adjust dose based on INR results as directed.       No current facility-administered medications for this visit.         No Known Allergies    Patient Active Problem List   Diagnosis     Hyperlipidemia     Depression     DVT (deep venous thrombosis) (H)     Swelling of limb     Post-thrombotic syndrome     Baker's cyst of knee, left     Left leg pain     Acquired bilateral flat feet     Coagulation factor deficiency syndrome (H)     Breast cancer, female, right     Benign essential hypertension       Past Medical History:   Diagnosis Date     Breast CA (H)     lymphnodes neg, estrogen receptor pos, progesterone receptor pos     Depression with anxiety age 46     DVT (deep venous thrombosis) (H)      HTN  "(hypertension)      Hyperlipidemia      PE (pulmonary embolism)        Past Surgical History:   Procedure Laterality Date     APPENDECTOMY       BREAST RECONSTRUCTION       MASTECTOMY  2001    for cancer dx in 1 breast     TONSILLECTOMY AND ADENOIDECTOMY       WRIST GANGLION EXCISION         Social History     Social History     Marital status:      Spouse name: N/A     Number of children: N/A     Years of education: N/A     Occupational History     Not on file.     Social History Main Topics     Smoking status: Former Smoker     Packs/day: 0.50     Years: 30.00     Types: Cigarettes     Quit date: 2/18/1985     Smokeless tobacco: Never Used      Comment: quit age 46     Alcohol use No     Drug use: No     Sexual activity: No     Other Topics Concern     Not on file     Social History Narrative    Has 4 children, .  had Parkinson's disease. RN retired.             Objective:     Vitals:    10/22/18 1521   Pulse: 61   SpO2: 97%   Weight: 156 lb (70.8 kg)   Height: 5' 4\" (1.626 m)         Physical Exam:  Pleasant woman in no distress.  She is alert and oriented and answers all questions appropriately.  Blood pressure 126/78.  Pulse 64 and regular.  EYES: Eyelids, conjunctiva, and sclera were normal. Pupils were normal. Cornea, iris, and lens were normal bilaterally.  HEAD, EARS, NOSE, MOUTH, AND THROAT: Head and face were normal. Hearing was normal to voice and the ears were normal to external exam. Nose appearance was normal and there was no discharge. Oropharynx was normal.  NECK: Neck appearance was normal. There were no neck masses and the thyroid was not enlarged.  No bruits in the neck.  RESPIRATORY: Breathing pattern was normal and the chest moved symmetrically.  Percussion/auscultatory percussion was normal.  Lung sounds were normal and there were no abnormal sounds.  CARDIOVASCULAR: Heart rate and rhythm were normal.  S1 and S2 were normal and there were no extra sounds or murmurs. " Peripheral pulses in arms and legs were normal.  Jugular venous pressure was normal.  There was no peripheral edema.  GASTROINTESTINAL: The abdomen was normal in contour.  Bowel sounds were present.  Percussion detected no organ enlargement or tenderness.  Palpation detected no tenderness, mass, or enlarged organs.   MUSCULOSKELETAL: Skeletal configuration was normal and muscle mass was normal for age. Joint appearance was overall normal.  Mild osteoarthritic changes.  LYMPHATIC: There were no enlarged nodes.  SKIN/HAIR/NAILS: Skin color was normal.  There were no skin lesions.  Hair and nails were normal.  NEUROLOGIC: The patient was alert and oriented to person, place, time, and circumstance. Speech was normal. Cranial nerves were normal. Motor strength was normal for age. The patient was normally coordinated.  PSYCHIATRIC:  Mood and affect were normal and the patient had normal recent and remote memory. The patient's judgment and insight were normal.    ADDITIONAL VITAL SIGNS: Oxygen saturations 96%.  CHEST WALL/BREASTS: Normal female.  RECTAL: Not checked by me.  Please see colorectal surgery note.  GENITAL/URINARY: Normal female        There are no Patient Instructions on file for this visit.        Labs: Hemoglobin and potassium drawn  Labs pending at this time.  Results will be reviewed when available.    Immunization History   Administered Date(s) Administered     Influenza high dose, seasonal 12/09/2015, 09/25/2017     Pneumo Conj 13-V (2010&after) 07/15/2015     Pneumo Polysac 23-V 05/15/2003     Td,adult,historic,unspecified 01/05/2005     ZOSTER, LIVE 02/07/2011           Electronically signed by Saad Marcelo MD 10/22/18 3:22 PM

## 2021-06-21 NOTE — LETTER
Letter by Jeremias Reid MD at      Author: Jeremias Reid MD Service: -- Author Type: --    Filed:  Encounter Date: 1/26/2021 Status: (Other)         Fabi Smith  [address] [place] [address]             January 26, 2021         Dear Ms. Smith,    Below are the results from your recent visit:    Resulted Orders   Thyroid Cascade   Result Value Ref Range    TSH 1.27 0.30 - 5.00 uIU/mL       thyroid labs are normal    Please call with questions or contact us using [employer].    Sincerely,        Electronically signed by Jeremias Reid MD

## 2021-06-24 NOTE — TELEPHONE ENCOUNTER
RN declined refill request for Losartan since Rx just filled on 11/29/18.  Pharmacy informed. Mendy Fernandez RN, [facility] Refill/Triage, 3/13/2019 7:50 AM

## 2021-06-24 NOTE — TELEPHONE ENCOUNTER
Dr. Hardy,  Okay for refill requested below?  It looks like the patient was taking a total of 5 mg of warfarin and I don't show any dose adjustments in her chart.  Just refill the 4 mg dose of warfarin or refill the 4 mg and 1 mg prescriptions.  Please advise.  Thank you.  Mara PERRY CMA/FLORENCIO....................3:29 PM

## 2021-06-24 NOTE — TELEPHONE ENCOUNTER
Medication Request  Medication name: Warfarin 4mg tablet  Pharmacy Name and Location: [facility]  Reason for request: Fax received from pharmacy requesting refill of the above medication  When did you use medication last?:  Unknown- listed as historical  Okay to leave a detailed message: no

## 2021-06-26 NOTE — PATIENT INSTRUCTIONS - HE
Continue medications as prescribed  Have your pharmacy contact us for a refill if you are running low on medications (We may ask you to come into clinic to get a refill from the nurse  No Alcohol or drug use  No driving if sedated  Call the clinic with any questions or concerns   Reach out for help if you feel like hurting yourself or others ([facility] [phone]: [address] [facility], [address] or [facility] Suicide Hotline [phone] , call 911 or go to nearest Emergency room    Follow up as directed, for your appointments, per your After Visit Summary Form.

## 2021-06-26 NOTE — PROGRESS NOTES
This video/telephone visit will be conducted via a call between you and your physician/provider. We have found that certain health care needs can be provided without the need for an in-person physical exam. This service lets us provide the care you need with a video /telephone conversation. If a prescription is necessary we can send it directly to your pharmacy. If lab work is needed we can place an order for that and you can then stop by a lab to have the test done at a later time.    Just as we bill insurance for in-person visits, we also bill insurance for video/telephone visits. If you have questions about your insurance coverage, we recommend that you speak with your insurance company.    Patient has given verbal consent for video visit? Yes   Patient would like the video visit invitation sent by: SIP CALL to: [email or web address]  TYESHA/RHYS MÉNDEZ CMA   AVS-Handout in office for [employer] Video   Referral by Jeremias Reid MD  Medications Currently: Buspar     10 mg - three times a day                   Remeron 15 mg - takes 30 mg at bedtime    Patient verified allergies, medications and pharmacy via phone. PHQ: 5 somewhat; SHAVON: 13 very  and Mood(Current): 0 done verbally with writer.   Patient states she is ready for visit. MN  to be reviewed by provider.

## 2021-07-06 VITALS
HEIGHT: 63 IN | WEIGHT: 155 LBS | SYSTOLIC BLOOD PRESSURE: 117 MMHG | DIASTOLIC BLOOD PRESSURE: 66 MMHG | HEART RATE: 64 BPM | BODY MASS INDEX: 27.46 KG/M2

## 2021-07-06 ASSESSMENT — PATIENT HEALTH QUESTIONNAIRE - PHQ9: SUM OF ALL RESPONSES TO PHQ QUESTIONS 1-9: 5

## 2021-07-08 ASSESSMENT — ANXIETY QUESTIONNAIRES: GAD7 TOTAL SCORE: 13

## 2021-08-03 ENCOUNTER — TELEPHONE (OUTPATIENT)
Dept: INTERNAL MEDICINE | Facility: CLINIC | Age: 81
End: 2021-08-03

## 2021-08-03 PROBLEM — I82.4Z1 ACUTE DEEP VEIN THROMBOSIS (DVT) OF DISTAL VEIN OF RIGHT LOWER EXTREMITY (H): Status: RESOLVED | Noted: 2019-08-20 | Resolved: 2021-02-05

## 2021-08-03 NOTE — TELEPHONE ENCOUNTER
"Patient calling back to let Dr. Swartz know that she was able to solve the \"mystery\". Patient states she called MD and they told her [facility] recorded it. Patient reached out to [facility] and they told her it was an error on their part. When pt called to check on the price of the shingrix, [facility] forgot to omit the shringrix, but scanned and sent in the lot#. [facility] told patient they will take care of it. Patient would just like to let Dr. Swartz know, no call back needed.   "

## 2021-08-03 NOTE — TELEPHONE ENCOUNTER
Patient states she did not receive shingrix vaccine on 4/8/21.  Looks to be a vaccine received outside of [facility] that was pulled into her chart.   It is not documented that we gave her the vaccine when she was seen on 4/8/21 by Dr Swartz.   She is requesting this be removed from her record. She has reviewed insurance statements and this vaccine is not on any of her statements.  Please review OV note. Ok to remove from immunization record?  She did receive the her first dose at the pharmacy on 5/27/21.

## 2021-08-03 NOTE — TELEPHONE ENCOUNTER
Incoming call from pt stating she saw Dr Swartz on 04/08 and pharmacy is telling patient that she received her shingrix. Patient stated she does not recall getting it here on 04/08. Patient is wanting to check with Dr. Swartz and if someone can reach back out to patient.

## 2021-08-08 DIAGNOSIS — I10 BENIGN ESSENTIAL HYPERTENSION: Primary | ICD-10-CM

## 2021-08-10 NOTE — TELEPHONE ENCOUNTER
hydroCHLOROthiazide (HYDRODIURIL) 25 MG tablet 90 tablet 3 8/14/2020  No   Sig: TAKE 1 TABLET BY MOUTH ONCE DAILY   Sent to pharmacy as: hydroCHLOROthiazide 25 mg tablet (HYDRODIURIL)   E-Prescribing Status: Receipt confirmed by pharmacy (8/14/2020  3:53 PM CDT)   hydroCHLOROthiazide (HYDRODIURIL) 25 MG tablet [[id number]]    Electronically signed by: Saad Marcelo MD on 08/14/20 1553 Status: Active   Ordering user: Saad Marcelo MD 08/14/20 1553 Authorized by: Saad Marcelo MD   Frequency:  08/14/20 - Until Discontinued Released by: Saad Marcelo MD 08/14/20 1553   Diagnoses  HTN (hypertension) [I10]       Routing refill request to provider for review/approval because:  Labs not current:  Multiple     Last Written Prescription Date:  8/14/20  Last Fill Quantity: 90,  # refills: 3   Last office visit provider:  4/28/21     Requested Prescriptions   Pending Prescriptions Disp Refills     hydrochlorothiazide (HYDRODIURIL) 25 MG tablet [Pharmacy Med Name: hydroCHLOROthiazide Oral Tablet 25 MG] 90 tablet 0     Sig: TAKE 1 TABLET BY MOUTH ONCE DAILY       Diuretics (Including Combos) Protocol Failed - 8/8/2021  2:00 AM        Failed - Medication is active on med list        Failed - Normal serum creatinine on file in past 12 months     Recent Labs   Lab Test 07/29/19  0920   CR 0.75              Failed - Normal serum potassium on file in past 12 months     Recent Labs   Lab Test 07/29/19  0920   POTASSIUM 3.5                    Failed - Normal serum sodium on file in past 12 months     Recent Labs   Lab Test 07/29/19  0920                 Passed - Blood pressure under 140/90 in past 12 months     BP Readings from Last 3 Encounters:   04/28/21 122/80   04/12/21 136/80   04/08/21 (!) 175/87                 Passed - Recent (12 mo) or future (30 days) visit within the authorizing provider's specialty     Patient has had an office visit with the authorizing provider or a provider within the  "authorizing providers department within the previous 12 mos or has a future within next 30 days. See \"Patient Info\" tab in inbasket, or \"Choose Columns\" in Meds & Orders section of the refill encounter.              Passed - Patient is age 18 or older        Passed - No active pregancy on record        Passed - No positive pregnancy test in past 12 months             Mason Romero RN 08/10/21 1:11 PM  "

## 2021-08-11 RX ORDER — HYDROCHLOROTHIAZIDE 25 MG/1
TABLET ORAL
Qty: 90 TABLET | Refills: 0 | Status: SHIPPED | OUTPATIENT
Start: 2021-08-11

## 2024-09-04 ENCOUNTER — TRANSFERRED RECORDS (OUTPATIENT)
Dept: HEALTH INFORMATION MANAGEMENT | Facility: CLINIC | Age: 84
End: 2024-09-04

## 2024-09-04 ENCOUNTER — OFFICE VISIT (OUTPATIENT)
Dept: CARDIOLOGY | Facility: CLINIC | Age: 84
End: 2024-09-04
Payer: MEDICARE

## 2024-09-04 ENCOUNTER — ORDERS ONLY (AUTO-RELEASED) (OUTPATIENT)
Dept: CARDIOLOGY | Facility: CLINIC | Age: 84
End: 2024-09-04

## 2024-09-04 VITALS
WEIGHT: 167 LBS | DIASTOLIC BLOOD PRESSURE: 73 MMHG | SYSTOLIC BLOOD PRESSURE: 107 MMHG | HEIGHT: 63 IN | HEART RATE: 74 BPM | RESPIRATION RATE: 18 BRPM | BODY MASS INDEX: 29.59 KG/M2

## 2024-09-04 DIAGNOSIS — I25.10 CORONARY ARTERY CALCIFICATION SEEN ON CAT SCAN: ICD-10-CM

## 2024-09-04 DIAGNOSIS — I10 PRIMARY HYPERTENSION: ICD-10-CM

## 2024-09-04 DIAGNOSIS — I48.91 NEW ONSET ATRIAL FIBRILLATION (H): Primary | ICD-10-CM

## 2024-09-04 DIAGNOSIS — R00.2 PALPITATIONS: ICD-10-CM

## 2024-09-04 DIAGNOSIS — I35.0 NONRHEUMATIC AORTIC VALVE STENOSIS: ICD-10-CM

## 2024-09-04 DIAGNOSIS — I48.91 NEW ONSET ATRIAL FIBRILLATION (H): ICD-10-CM

## 2024-09-04 PROCEDURE — G2211 COMPLEX E/M VISIT ADD ON: HCPCS | Performed by: INTERNAL MEDICINE

## 2024-09-04 PROCEDURE — 99204 OFFICE O/P NEW MOD 45 MIN: CPT | Performed by: INTERNAL MEDICINE

## 2024-09-04 RX ORDER — ATORVASTATIN CALCIUM 40 MG/1
40 TABLET, FILM COATED ORAL DAILY
COMMUNITY
Start: 2024-08-02

## 2024-09-04 RX ORDER — AMLODIPINE BESYLATE 5 MG/1
1 TABLET ORAL DAILY
COMMUNITY
Start: 2024-08-02

## 2024-09-04 RX ORDER — METOPROLOL SUCCINATE 25 MG/1
0.5 TABLET, EXTENDED RELEASE ORAL DAILY
COMMUNITY
Start: 2024-09-04

## 2024-09-04 RX ORDER — VITAMIN B COMPLEX
2000 TABLET ORAL DAILY
COMMUNITY

## 2024-09-04 RX ORDER — MIRTAZAPINE 15 MG/1
45 TABLET, FILM COATED ORAL AT BEDTIME
COMMUNITY
Start: 2024-08-28

## 2024-09-04 RX ORDER — CHOLECALCIFEROL (VITAMIN D3) 125 MCG
1 CAPSULE ORAL DAILY
COMMUNITY

## 2024-09-04 RX ORDER — BUSPIRONE HYDROCHLORIDE 15 MG/1
15 TABLET ORAL 3 TIMES DAILY
COMMUNITY
Start: 2024-08-28

## 2024-09-04 RX ORDER — WARFARIN SODIUM 5 MG/1
TABLET ORAL DAILY
COMMUNITY
Start: 2024-03-13

## 2024-09-04 RX ORDER — GINSENG 100 MG
1 CAPSULE ORAL DAILY
COMMUNITY

## 2024-09-04 RX ORDER — LOPERAMIDE HCL 2 MG
2 CAPSULE ORAL PRN
COMMUNITY

## 2024-09-04 RX ORDER — LOSARTAN POTASSIUM 100 MG/1
1 TABLET ORAL DAILY
COMMUNITY
Start: 2024-08-02

## 2024-09-04 NOTE — PROGRESS NOTES
HEART CARE ENCOUNTER CONSULTATON NOTE      [facility]  [phone]      Assessment/Recommendations   Assessment:   New onset atrial fibrillation.  Twelve-lead EKG obtained today demonstrates A-fib with heart rate of 92 bpm.  Abnormal EKG with lateral ST segment depressions concerning for lateral ischemia (new from EKG from 2021.  Hypertension  Hyperlipidemia  Aortic valve stenosis (mild to moderate echo 2023, MALISSA:1.4 cm2, DI:.39, mean gradient 16 mm Hg).   Chronic anticoagulation on warfarin (history of DVT and PE)  Coronary artery disease, prior calcium score 1836 in 2021.  CTA demonstrated mild to moderate stenosis (diffuse).    Severe left atrial enlargement (echo 2023).     Plan:  Recommend metoprolol XL 25 mg daily  Continue anticoagulation with warfarin   3-day Zio patch monitor to confirm rate control  Complete echocardiogram  Recommend treadmill nuclear stress test given severely elevated coronary calcium score, abnormal EKG         History of Present Illness/Subjective    HPI: Fabi Smith is a 84 year old female history of coronary disease, hyperlipidemia, hypertension who presents to cardiology clinic in consultation for new onset A-fib.    According the patient she awoke last night with a sense of palpitations.  Given the symptoms she presented to her primary care provider's office.  In her primary care provider's office she was noted to be in A-fib with controlled heart rate.  Twelve-lead EKG was personally reviewed which demonstrated A-fib with heart rates around 90 bpm.  She had ST and T wave abnormalities in the lateral leads which appear to be new compared to 2021 EKG.  No confirmed diagnosis of A-fib but she has had episodes similar to this in the past.  She denies any triggers for her episode overnight.  She has started her metoprolol XL at 12.5 mg daily will increase to 25 mg daily.      Echocardiogram Results:  inal Conclusion    1. Normal left ventricular chamber size  "and systolic function. Estimated left ventricular   ejection fraction is 55-60%.    2. Normal right ventricular size and systolic function.    3. Mild-moderate calcific aortic valve stenosis.Aortic valve systolic mean gradient is 16   mmHg.Aortic valve area by Doppler is 1.4    cm .Aortic valve dimensionless index is 0.39.Trivial aortic valve regurgitation.    4. Aortic sinus of Valsalva is normal in size (3.3 cm, ZScore = -0.71).Normal indexed   ascending aorta dimension (3.7 cm, 2.1 cm/m ).   5. Severe left Atrium Severe left atrial enlargement. Left atrial volume index is 62 ml/m .     Coronary CT Angiogram: 2021  CONCLUSIONS:   1.  Right dominant coronary arterial system.   2.  Total Agatston score 1836; 95th percentile compared to age and   gender-matched control group.   3.  Mild to moderate diffuse, calcified atherosclerosis proximal and mid   left anterior descending coronary artery and mid right coronary artery   with minimal calcified atherosclerosis proximal left circumflex coronary   artery (and remainder of right coronary artery).   4.  Normal caliber aortic root and ascending aorta.   5.  Incomplete opacification, tortuous left atrial appendage; cannot   exclude left atrial appendage mass and/or thrombus within the protocol for   this exam.   6.  No pericardial effusion.          Physical Examination  Review of Systems   Vitals: /73 (BP Location: Right arm, Patient Position: Sitting, Cuff Size: Adult Regular)   Pulse 74   Resp 18   Ht 1.6 m (5' 3\")   Wt 75.8 kg (167 lb)   BMI 29.58 kg/m    BMI= Body mass index is 29.58 kg/m .  Wt Readings from Last 3 Encounters:   09/04/24 75.8 kg (167 lb)   04/28/21 72.3 kg (159 lb 6.4 oz)   04/12/21 71.2 kg (157 lb)        Pleasant   ENT/Mouth: membranes moist, no oral lesions or bleeding gums.      EYES:  no scleral icterus, normal conjunctivae       Chest/Lungs:   lungs are clear to auscultation, no rales or wheezing, no sternal scar, equal chest wall " expansion    Cardiovascular:   Irregular. Normal first and second heart sounds with 3/6 mid peaking systolic murmur No  rubs, or gallops; the carotid, radial and posterior tibial pulses are intact, Jugular venous pressure normal, trace edema bilaterally    Abdomen:  no tenderness; bowel sounds are present   Extremities: no cyanosis or clubbing   Skin: no xanthelasma, warm.    Neurologic: normal  bilateral, no tremors     Psychiatric: alert and oriented x3, calm        Please refer above for cardiac ROS details.        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    Swelling of limb 2/18/2016    Swelling of right lower extremity 8/20/2019     Past Surgical History:   Procedure Laterality Date    APPENDECTOMY      APPENDECTOMY      BREAST SURGERY      COLONOSCOPY      MASTECTOMY      MASTECTOMY  2001    for cancer dx in 1 breast    REVISE RECONSTRUCTED BREAST      TONSILLECTOMY & ADENOIDECTOMY      WRIST GANGLION EXCISION       Family History   Problem Relation Age of Onset    Uterine Cancer Mother         passed    Thyroid Cancer Father         passed    No Known Problems Brother     Clotting Disorder Brother         Social History     Socioeconomic History    Marital status:      Spouse name: Not on file    Number of children: Not on file    Years of education: Not on file    Highest education level: Not on file   Occupational History    Not on file   Tobacco Use    Smoking status: Former     Current packs/day: 0.00     Average packs/day: 0.5 packs/day for 30.0 years (15.0 ttl pk-yrs)      Types: Cigarettes     Start date: 1955     Quit date: 1985     Years since quittin.5    Smokeless tobacco: Never    Tobacco comments:     quit age 46   Substance and Sexual Activity    Alcohol use: No     Comment: Alcoholic Drinks/day: hx of alcoholism, treated and sober since     Drug use: No    Sexual activity: Never   Other Topics Concern    Parent/sibling w/ CABG, MI or angioplasty before 65F 55M? Not Asked   Social History Narrative    Has 4 children, .  had Parkinson's disease. RN retired.    Lives alone in a house.  had Parkinson's' and  from that. Anxiety really hit her about a year ago. Independent of ADL's. Walking 2 miles per day. She is cross country ski ing. She has 4 children all in [place] and [facility]. 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 - Dr. Fontana 19      Social Determinants of Health     Financial Resource Strain: Low Risk  (2024)    Received from [employer]    Financial Resource Strain     Difficulty of Paying Living Expenses: 3     Difficulty of Paying Living Expenses: Not on file   Food Insecurity: No Food Insecurity (2024)    Received from [employer]    Food Insecurity     Worried About Running Out of Food in the Last Year: 1   Transportation Needs: No Transportation Needs (2024)    Received from [employer]    Transportation Needs     Lack of Transportation (Medical): 1   Physical Activity: Not on file   Stress: Not on file   Social Connections: Socially Integrated (2024)    Received from [employer]    Social Connections     Frequency of Communication with Friends and Family: 0   Interpersonal Safety: Not on file   Housing Stability: Low Risk  (2024)    Received from [employer]    Housing  "Stability     Unable to Pay for Housing in the Last Year: 1           Medications  Allergies   Current Outpatient Medications   Medication Sig Dispense Refill    amLODIPine (NORVASC) 5 MG tablet Take 1 tablet by mouth daily.      atorvastatin (LIPITOR) 40 MG tablet Take 40 mg by mouth daily.      busPIRone (BUSPAR) 15 MG tablet Take 15 mg by mouth 3 times daily.      hydrochlorothiazide (HYDRODIURIL) 25 MG tablet TAKE 1 TABLET BY MOUTH ONCE DAILY 90 tablet 0    loperamide (IMODIUM) 2 MG capsule Take 2 mg by mouth as needed.      losartan (COZAAR) 100 MG tablet Take 1 tablet by mouth daily.      metoprolol succinate ER (TOPROL XL) 25 MG 24 hr tablet Take 0.5 tablets by mouth daily.      mirtazapine (REMERON) 15 MG tablet Take 45 mg by mouth at bedtime.      Vitamin D3 (VITAMIN D-1000 MAX ST) 25 mcg (1000 units) tablet Take 2,000 Units by mouth daily.      warfarin ANTICOAGULANT (COUMADIN) 5 MG tablet Take by mouth daily.      Lactobacillus (PROBIOTIC ACIDOPHILUS) CAPS Take 1 capsule by mouth daily.      zinc 50 MG TABS Take 1 tablet by mouth daily.         Allergies   Allergen Reactions    Lexapro [Escitalopram] Unknown     Developed Insomnia with this    Prozac [Fluoxetine] Unknown     On and off for years but recently increased Anxiety for her    Zoloft [Sertraline] Unknown     Patient felt bazaar while taking this.           Lab Results    Chemistry/lipid CBC Cardiac Enzymes/BNP/TSH/INR   Recent Labs   Lab Test 09/23/20  1024   CHOL 218*   HDL 79      TRIG 100     Recent Labs   Lab Test 09/23/20  1024 07/29/19  0920    148*     Recent Labs   Lab Test 07/29/19  0920      POTASSIUM 3.5   CHLORIDE 103   CO2 25   GLC 89   BUN 12   CR 0.75   GFRESTIMATED >60   SANFORD 10.4     Recent Labs   Lab Test 07/29/19  0920 11/19/18  1038 10/22/18  1554   CR 0.75 0.85 1.52*     No results for input(s): \"A1C\" in the last 07261 hours.       Recent Labs   Lab Test 10/22/18  1554   HGB 14.0     Recent Labs   Lab Test " "10/22/18  1554   HGB 14.0    No results for input(s): \"TROPONINI\" in the last 00152 hours.  No results for input(s): \"BNP\", \"NTBNPI\", \"NTBNP\" in the last 35324 hours.  Recent Labs   Lab Test 01/20/21  1320   TSH 1.27     Recent Labs   Lab Test 07/29/19  0920   INR 2.21*        Bob Oscar DO  Cardiologist     The longitudinal plan of care for the diagnosis(es)/condition(s) as documented were addressed during this visit. Due to the added complexity in care, I will continue to support Fabi in the subsequent management and with ongoing continuity of care.                              "

## 2024-09-04 NOTE — LETTER
9/4/2024    Verenice Yoder PsyD  No address on file    RE: Fabi Smith       Dear Colleague,     I had the [facility] of seeing Fab[facility] Smith in the [facility].    HEART CARE ENCOUNTER CONSULTATON NOTE      [facility]  [phone]      Assessment/Recommendations   Assessment:   New onset atrial fibrillation.  Twelve-lead EKG obtained today demonstrates A-fib with heart rate of 92 bpm.  Abnormal EKG with lateral ST segment depressions concerning for lateral ischemia (new from EKG from 2021.  Hypertension  Hyperlipidemia  Aortic valve stenosis (mild to moderate echo 2023, MALISSA:1.4 cm2, DI:.39, mean gradient 16 mm Hg).   Chronic anticoagulation on warfarin (history of DVT and PE)  Coronary artery disease, prior calcium score 1836 in 2021.  CTA demonstrated mild to moderate stenosis (diffuse).    Severe left atrial enlargement (echo 2023).     Plan:  Recommend metoprolol XL 25 mg daily  Continue anticoagulation with warfarin   3-day Zio patch monitor to confirm rate control  Complete echocardiogram  Recommend treadmill nuclear stress test given severely elevated coronary calcium score, abnormal EKG         History of Present Illness/Subjective    HPI: Fabi Smith is a 84 year old female history of coronary disease, hyperlipidemia, hypertension who presents to cardiology clinic in consultation for new onset A-fib.    According the patient she awoke last night with a sense of palpitations.  Given the symptoms she presented to her primary care provider's office.  In her primary care provider's office she was noted to be in A-fib with controlled heart rate.  Twelve-lead EKG was personally reviewed which demonstrated A-fib with heart rates around 90 bpm.  She had ST and T wave abnormalities in the lateral leads which appear to be new compared to 2021 EKG.  No confirmed diagnosis of A-fib but she has had episodes similar to this in the past.  She denies any triggers for her  "episode overnight.  She has started her metoprolol XL at 12.5 mg daily will increase to 25 mg daily.      Echocardiogram Results:  inal Conclusion    1. Normal left ventricular chamber size and systolic function. Estimated left ventricular   ejection fraction is 55-60%.    2. Normal right ventricular size and systolic function.    3. Mild-moderate calcific aortic valve stenosis.Aortic valve systolic mean gradient is 16   mmHg.Aortic valve area by Doppler is 1.4    cm .Aortic valve dimensionless index is 0.39.Trivial aortic valve regurgitation.    4. Aortic sinus of Valsalva is normal in size (3.3 cm, ZScore = -0.71).Normal indexed   ascending aorta dimension (3.7 cm, 2.1 cm/m ).   5. Severe left Atrium Severe left atrial enlargement. Left atrial volume index is 62 ml/m .     Coronary CT Angiogram: 2021  CONCLUSIONS:   1.  Right dominant coronary arterial system.   2.  Total Agatston score 1836; 95th percentile compared to age and   gender-matched control group.   3.  Mild to moderate diffuse, calcified atherosclerosis proximal and mid   left anterior descending coronary artery and mid right coronary artery   with minimal calcified atherosclerosis proximal left circumflex coronary   artery (and remainder of right coronary artery).   4.  Normal caliber aortic root and ascending aorta.   5.  Incomplete opacification, tortuous left atrial appendage; cannot   exclude left atrial appendage mass and/or thrombus within the protocol for   this exam.   6.  No pericardial effusion.          Physical Examination  Review of Systems   Vitals: /73 (BP Location: Right arm, Patient Position: Sitting, Cuff Size: Adult Regular)   Pulse 74   Resp 18   Ht 1.6 m (5' 3\")   Wt 75.8 kg (167 lb)   BMI 29.58 kg/m    BMI= Body mass index is 29.58 kg/m .  Wt Readings from Last 3 Encounters:   09/04/24 75.8 kg (167 lb)   04/28/21 72.3 kg (159 lb 6.4 oz)   04/12/21 71.2 kg (157 lb)        Pleasant   ENT/Mouth: membranes moist, no oral " lesions or bleeding gums.      EYES:  no scleral icterus, normal conjunctivae       Chest/Lungs:   lungs are clear to auscultation, no rales or wheezing, no sternal scar, equal chest wall expansion    Cardiovascular:   Irregular. Normal first and second heart sounds with 3/6 mid peaking systolic murmur No  rubs, or gallops; the carotid, radial and posterior tibial pulses are intact, Jugular venous pressure normal, trace edema bilaterally    Abdomen:  no tenderness; bowel sounds are present   Extremities: no cyanosis or clubbing   Skin: no xanthelasma, warm.    Neurologic: normal  bilateral, no tremors     Psychiatric: alert and oriented x3, calm        Please refer above for cardiac ROS details.        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     Swelling of limb 2/18/2016     Swelling of right lower extremity 8/20/2019     Past Surgical History:   Procedure Laterality Date     APPENDECTOMY       APPENDECTOMY       BREAST SURGERY       COLONOSCOPY       MASTECTOMY       MASTECTOMY  2001    for cancer dx in 1 breast     REVISE RECONSTRUCTED BREAST       TONSILLECTOMY & ADENOIDECTOMY       WRIST GANGLION EXCISION       Family History   Problem Relation Age of Onset     Uterine Cancer Mother         passed     Thyroid Cancer Father         passed     No Known Problems Brother      Clotting Disorder Brother         Social History     Socioeconomic History     Marital status:      Spouse name: Not on file     Number of children: Not on file     Years of education: Not on file      Highest education level: Not on file   Occupational History     Not on file   Tobacco Use     Smoking status: Former     Current packs/day: 0.00     Average packs/day: 0.5 packs/day for 30.0 years (15.0 ttl pk-yrs)     Types: Cigarettes     Start date: 1955     Quit date: 1985     Years since quittin.5     Smokeless tobacco: Never     Tobacco comments:     quit age 46   Substance and Sexual Activity     Alcohol use: No     Comment: Alcoholic Drinks/day: hx of alcoholism, treated and sober since      Drug use: No     Sexual activity: Never   Other Topics Concern     Parent/sibling w/ CABG, MI or angioplasty before 65F 55M? Not Asked   Social History Narrative    Has 4 children, .  had Parkinson's disease. RN retired.    Lives alone in a house.  had Parkinson's' and  from that. Anxiety really hit her about a year ago. Independent of ADL's. Walking 2 miles per day. She is cross country ski ing. She has 4 children all in [place] and [facility]. 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 - Dr. Fontana 21      Social Determinants of Health     Financial Resource Strain: Low Risk  (2024)    Received from [employer]    Financial Resource Strain      Difficulty of Paying Living Expenses: 3      Difficulty of Paying Living Expenses: Not on file   Food Insecurity: No Food Insecurity (2024)    [facility]    Food Insecurity      Worried About Running Out of Food in the Last Year: 1   Transportation Needs: No Transportation Needs (2024)    Received from [facility]    Transportation Needs      Lack of Transportation (Medical): 1   Physical Activity: Not on file   Stress: Not on file   Social Connections: Socially Integrated (2024)    Received from [facility]     Social Connections      Frequency of Communication with Friends and Family: 0   Interpersonal Safety: Not on file   Housing Stability: Low Risk  (8/28/2024)    Received from [employer] [facility]    Housing Stability      Unable to Pay for Housing in the Last Year: 1           Medications  Allergies   Current Outpatient Medications   Medication Sig Dispense Refill     amLODIPine (NORVASC) 5 MG tablet Take 1 tablet by mouth daily.       atorvastatin (LIPITOR) 40 MG tablet Take 40 mg by mouth daily.       busPIRone (BUSPAR) 15 MG tablet Take 15 mg by mouth 3 times daily.       hydrochlorothiazide (HYDRODIURIL) 25 MG tablet TAKE 1 TABLET BY MOUTH ONCE DAILY 90 tablet 0     loperamide (IMODIUM) 2 MG capsule Take 2 mg by mouth as needed.       losartan (COZAAR) 100 MG tablet Take 1 tablet by mouth daily.       metoprolol succinate ER (TOPROL XL) 25 MG 24 hr tablet Take 0.5 tablets by mouth daily.       mirtazapine (REMERON) 15 MG tablet Take 45 mg by mouth at bedtime.       Vitamin D3 (VITAMIN D-1000 MAX ST) 25 mcg (1000 units) tablet Take 2,000 Units by mouth daily.       warfarin ANTICOAGULANT (COUMADIN) 5 MG tablet Take by mouth daily.       Lactobacillus (PROBIOTIC ACIDOPHILUS) CAPS Take 1 capsule by mouth daily.       zinc 50 MG TABS Take 1 tablet by mouth daily.         Allergies   Allergen Reactions     Lexapro [Escitalopram] Unknown     Developed Insomnia with this     Prozac [Fluoxetine] Unknown     On and off for years but recently increased Anxiety for her     Zoloft [Sertraline] Unknown     Patient felt bazaar while taking this.           Lab Results    Chemistry/lipid CBC Cardiac Enzymes/BNP/TSH/INR   Recent Labs   Lab Test 09/23/20  1024   CHOL 218*   HDL 79      TRIG 100     Recent Labs   Lab Test 09/23/20  1024 07/29/19  0920    148*     Recent Labs   Lab Test 07/29/19  0920      POTASSIUM 3.5   CHLORIDE 103   CO2 25   GLC 89   BUN 12   CR 0.75   GFRESTIMATED >60  "  SANFORD 10.4     Recent Labs   Lab Test 07/29/19  0920 11/19/18  1038 10/22/18  1554   CR 0.75 0.85 1.52*     No results for input(s): \"A1C\" in the last 19537 hours.       Recent Labs   Lab Test 10/22/18  1554   HGB 14.0     Recent Labs   Lab Test 10/22/18  1554   HGB 14.0    No results for input(s): \"TROPONINI\" in the last 97280 hours.  No results for input(s): \"BNP\", \"NTBNPI\", \"NTBNP\" in the last 10762 hours.  Recent Labs   Lab Test 01/20/21  1320   TSH 1.27     Recent Labs   Lab Test 07/29/19  0920   INR 2.21*        Bob Oscar DO  Cardiologist     The longitudinal plan of care for the diagnosis(es)/condition(s) as documented were addressed during this visit. Due to the added complexity in care, I will continue to support Fabi in the subsequent management and with ongoing continuity of care.                                Thank you for allowing me to participate in the care of your patient.      Sincerely,     Bob Oscar DO     [facility]  cc:   Torie Akhtar MD  No address on file      "

## 2024-09-04 NOTE — PATIENT INSTRUCTIONS
Please contact direct nurses line Monday through Friday 8 AM to 5 PM @ [phone]    After-hours contact cardiology office at [phone].    Plan:   Recommend taking metoprolol succinate 25 mg daily  Heart monitor, Zio patch, will be sent to your home.  This is to monitor your heart rate control while you are in atrial fibrillation.  Complete echocardiogram to assess aortic valve stenosis, heart function  Treadmill stress test which can be converted to a medication stress test if needed to rule out blockages in your heart artery.   Continue warfarin for stroke prevention in the setting of A-fib  Will be called with results of stress testing

## 2024-09-21 NOTE — TELEPHONE ENCOUNTER
What ever dosage is easiest for her.  Perhaps she likes taking a 4 mg tablet plus 2 , one mg tabs.  I am willing to accommodate her.   Statement Selected

## 2025-04-15 LAB — INR (EXTERNAL): 2.1 (ref 0.9–1.1)

## 2025-04-23 ENCOUNTER — OFFICE VISIT (OUTPATIENT)
Dept: INTERNAL MEDICINE | Facility: CLINIC | Age: 85
End: 2025-04-23
Payer: MEDICARE

## 2025-04-23 ENCOUNTER — ANTICOAGULATION THERAPY VISIT (OUTPATIENT)
Dept: ANTICOAGULATION | Facility: CLINIC | Age: 85
End: 2025-04-23

## 2025-04-23 VITALS
SYSTOLIC BLOOD PRESSURE: 104 MMHG | DIASTOLIC BLOOD PRESSURE: 67 MMHG | HEART RATE: 62 BPM | BODY MASS INDEX: 28.26 KG/M2 | TEMPERATURE: 97.6 F | WEIGHT: 159.5 LBS | RESPIRATION RATE: 16 BRPM | HEIGHT: 63 IN | OXYGEN SATURATION: 96 %

## 2025-04-23 DIAGNOSIS — I48.0 PAROXYSMAL A-FIB (H): ICD-10-CM

## 2025-04-23 DIAGNOSIS — I35.0 NONRHEUMATIC AORTIC VALVE STENOSIS: ICD-10-CM

## 2025-04-23 DIAGNOSIS — N39.41 URGE INCONTINENCE OF URINE: ICD-10-CM

## 2025-04-23 DIAGNOSIS — D68.59 PROTEIN C DEFICIENCY: Primary | ICD-10-CM

## 2025-04-23 DIAGNOSIS — I25.10 CORONARY ARTERY CALCIFICATION: ICD-10-CM

## 2025-04-23 DIAGNOSIS — Z86.718 HISTORY OF DEEP VENOUS THROMBOSIS: ICD-10-CM

## 2025-04-23 DIAGNOSIS — Z23 ENCOUNTER FOR VACCINATION: ICD-10-CM

## 2025-04-23 DIAGNOSIS — Z85.3 HISTORY OF MALIGNANT NEOPLASM OF BREAST: ICD-10-CM

## 2025-04-23 DIAGNOSIS — R15.9 FULL INCONTINENCE OF FECES: ICD-10-CM

## 2025-04-23 DIAGNOSIS — F41.1 GAD (GENERALIZED ANXIETY DISORDER): ICD-10-CM

## 2025-04-23 DIAGNOSIS — F33.1 MAJOR DEPRESSIVE DISORDER, RECURRENT, MODERATE (H): ICD-10-CM

## 2025-04-23 DIAGNOSIS — E78.2 MIXED HYPERLIPIDEMIA: ICD-10-CM

## 2025-04-23 DIAGNOSIS — I10 BENIGN ESSENTIAL HYPERTENSION: Primary | ICD-10-CM

## 2025-04-23 DIAGNOSIS — D68.59 PROTEIN C DEFICIENCY: ICD-10-CM

## 2025-04-23 PROBLEM — K21.9 GASTROESOPHAGEAL REFLUX DISEASE: Status: ACTIVE | Noted: 2025-04-23

## 2025-04-23 PROBLEM — F41.9 ANXIETY: Status: RESOLVED | Noted: 2021-02-05 | Resolved: 2025-04-23

## 2025-04-23 PROBLEM — R00.2 PALPITATIONS: Status: RESOLVED | Noted: 2019-08-20 | Resolved: 2025-04-23

## 2025-04-23 PROCEDURE — 3078F DIAST BP <80 MM HG: CPT | Performed by: INTERNAL MEDICINE

## 2025-04-23 PROCEDURE — 36415 COLL VENOUS BLD VENIPUNCTURE: CPT | Performed by: INTERNAL MEDICINE

## 2025-04-23 PROCEDURE — 80048 BASIC METABOLIC PNL TOTAL CA: CPT | Performed by: INTERNAL MEDICINE

## 2025-04-23 PROCEDURE — 90480 ADMN SARSCOV2 VAC 1/ONLY CMP: CPT | Performed by: INTERNAL MEDICINE

## 2025-04-23 PROCEDURE — 99204 OFFICE O/P NEW MOD 45 MIN: CPT | Performed by: INTERNAL MEDICINE

## 2025-04-23 PROCEDURE — 91320 SARSCV2 VAC 30MCG TRS-SUC IM: CPT | Performed by: INTERNAL MEDICINE

## 2025-04-23 PROCEDURE — 80061 LIPID PANEL: CPT | Performed by: INTERNAL MEDICINE

## 2025-04-23 PROCEDURE — 3074F SYST BP LT 130 MM HG: CPT | Performed by: INTERNAL MEDICINE

## 2025-04-23 PROCEDURE — G2211 COMPLEX E/M VISIT ADD ON: HCPCS | Performed by: INTERNAL MEDICINE

## 2025-04-23 ASSESSMENT — PATIENT HEALTH QUESTIONNAIRE - PHQ9
SUM OF ALL RESPONSES TO PHQ QUESTIONS 1-9: 8
10. IF YOU CHECKED OFF ANY PROBLEMS, HOW DIFFICULT HAVE THESE PROBLEMS MADE IT FOR YOU TO DO YOUR WORK, TAKE CARE OF THINGS AT HOME, OR GET ALONG WITH OTHER PEOPLE: SOMEWHAT DIFFICULT
SUM OF ALL RESPONSES TO PHQ QUESTIONS 1-9: 8

## 2025-04-23 NOTE — ASSESSMENT & PLAN NOTE
New dx 9/2024. Rate controlled with metoprolol XL 12.5 mg daily. Had one episode of breakthrough a few weeks ago but did convert spontaneously after a few hours.   - Referred to cardiology here to establish ongoing care   - If recurrent breakthrough episodes, may need to see EP to talk about DCCV or rhythm control    Minocycline Pregnancy And Lactation Text: This medication is Pregnancy Category D and not consider safe during pregnancy. It is also excreted in breast milk.

## 2025-04-23 NOTE — PROGRESS NOTES
"ANTICOAGULATION  MANAGEMENT: NEW REFERRAL      SUBJECTIVE/OBJECTIVE     Fabi Smith, a 84 year old female  is newly referred to [facility].    Anticoagulation:    Previously on warfarin: yes, currently on warfarin transferring anticoagulation management to [facility]. Previously managed by [facility]. Most recent warfarin dose 5mg x 3, 4 mg x 4  since months.  Most recent INR 2.1on 4/15/25  Warfarin initiation date (approximate): 1990   Indication(s): Atrial Fibrillation   Goal Range: 2.0-3.0   Anticoagulation Bridge/Overlap: No   Referring provider: from PCP new pcp    General Dietary/Social Hx:    Typical vitamin K intake: high; consistent     Other dietary considerations: None     Social History:  no cannabis , etoh or tobacco    In the past 2 weeks, patient estimates taking medications as instructed % of time: 100    Results:        No results for input(s): \"INR\", \"[id number]\", \"F2\", \"ALMWH\" in the last 168 hours.    Wt Readings from Last 2 Encounters:   04/23/25 72.3 kg (159 lb 8 oz)   09/04/24 75.8 kg (167 lb)      Estimated body mass index is 28.25 kg/m  as calculated from the following:    Height as of an earlier encounter on 4/23/25: 1.6 m (5' 3\").    Weight as of an earlier encounter on 4/23/25: 72.3 kg (159 lb 8 oz).  No results found for: \"AST\", \"ALT\", \"ALBUMIN\"  Lab Results   Component Value Date    CR 0.75 07/29/2019     CrCl cannot be calculated (Patient's most recent lab result is older than the maximum 365 days allowed.).    ASSESSMENT     Goal INR 2-3, standard for indication(s) above  On warfarin > 30 days; maintenance dose has been established      PLAN     Dosing Instructions: Continue your current warfarin dose with INR in 3 weeks       Summary  As of 4/23/2025      Full warfarin instructions:  5 mg every Mon, Wed, Fri; 4 mg all other days   Next INR check:  5/14/2025               Education provided:   Please call back if any changes to your diet, " medications or how you've been taking warfarin    Education still needed:   Please call back if any changes to your diet, medications or how you've been taking warfarin      Telephone call with Fabi who verbalizes understanding and agrees to plan    Lab visit scheduled wants to Try [facility] for inrs.  Prefers venous draw, order entered    Standing orders placed in Epic: Point of Care INR (Lab 5000) and Venous INR  (Lab 3572)    Plan made per [facility] anticoagulation protocol    Teodoro Gonzalez RN  Anticoagulation Clinic  4/23/2025

## 2025-04-23 NOTE — ASSESSMENT & PLAN NOTE
Lifelong hx of HLD and quite elevated calcium score in 2021. Discussed with CAD, goal LDL <70. Last was 132 on atorvastatin 40 mg daily.  - Repeat lipids, if LDL above goal, switch to crestor 40

## 2025-04-23 NOTE — ASSESSMENT & PLAN NOTE
CAC 1836 (5/2021). Dr. Oscar recommended stress test, not yet done.   - Do need to control lipids better, plan as per HLD  - On warfarin   - Cardiology referral placed for this and Afib today

## 2025-04-23 NOTE — PROGRESS NOTES
Assessment & Plan   Problem List Items Addressed This Visit          Digestive    Full incontinence of feces     Intermittent stool incontinence. Doesn't always have trigger that she needs to have BM and will still be incontinent. Keeping her from going out.   - Recommend PFPT and biofeedback, referral placed  - Okay for PRN imodium before outings          Relevant Orders    Physical Therapy  Referral       Endocrine    Hyperlipidemia (Chronic)     Lifelong hx of HLD and quite elevated calcium score in 2021. Discussed with CAD, goal LDL <70. Last was 132 on atorvastatin 40 mg daily.  - Repeat lipids, if LDL above goal, switch to crestor 40         Relevant Orders    Lipid panel reflex to direct LDL Fasting       Circulatory    Essential hypertension - Primary     BP a little too soft today.   - Decrease losartan 100 -> 50 mg daily  - Continue amlodipine 10 mg, hydrochlorothiazide 25 mg and metoprolol XL 12.5 mg daily          Coronary artery calcification     CAC 1836 (5/2021). Dr. Oscar recommended stress test, not yet done.   - Do need to control lipids better, plan as per HLD  - On warfarin   - Cardiology referral placed for this and Afib today          Relevant Orders    Adult Cardiology Eval  Referral    Paroxysmal A-fib (H)     New dx 9/2024. Rate controlled with metoprolol XL 12.5 mg daily. Had one episode of breakthrough a few weeks ago but did convert spontaneously after a few hours.   - Referred to cardiology here to establish ongoing care   - If recurrent breakthrough episodes, may need to see EP to talk about DCCV or rhythm control          Relevant Orders    Anticoagulation Clinic Referral    Adult Cardiology Eval  Referral    Nonrheumatic aortic valve stenosis     Mild to mod on TTE 7/2023. Mild on TTE 10/2024.   - Continue to monitor every 1-2 years            Urinary    Urge incontinence of urine     More of an issue at home. Discussed kegels and importance of pelvic  "floor strength.   - Will refer for PFPT         Relevant Orders    Physical Therapy  Referral       Hematologic    Protein C deficiency     Chronic warfarin for AC.   - Referred to our AC clinic         Relevant Orders    Anticoagulation Clinic Referral       Behavioral    SHAVON (generalized anxiety disorder)     Significant issue. Has had worsening anxiety with age. Notes that things that used to not bother her are a much better deal. Not taking buspirone TID every day.   - Before we make any adjustments will have her consistently take buspirone 15 mg TID and mirtazapine 45 mg at bedtime  - F/up at [facility], have room to go up on buspirone         Major depressive disorder, recurrent, moderate (H)     As per SHAVON.            Other    History of deep venous thrombosis     Chronic warfarin.          History of malignant neoplasm of breast     Follows at [facility]          Other Visit Diagnoses       Encounter for vaccination        Relevant Orders    COVID-19 12+ (PFIZER) (Completed)                  BMI  Estimated body mass index is 28.25 kg/m  as calculated from the following:    Height as of this encounter: 1.6 m (5' 3\").    Weight as of this encounter: 72.3 kg (159 lb 8 oz).   Weight management plan: Discussed healthy diet and exercise guidelines    The longitudinal plan of care for the diagnosis(es)/condition(s) as documented were addressed during this visit. Due to the added complexity in care, I will continue to support Fabi in the subsequent management and with ongoing continuity of care.    Follow-up wellness visit in August.     Zohaib Dunlap is a 84 year old, presenting for the following health issues:  Establish Care        4/23/2025    11:16 AM   Additional Questions   Roomed by HELENA Buckner     History of Present Illness     Reason for visit:  Margot Dunlap is here to establish care.  Changing from [facility] to be in a system closer to [place]. We reviewed her medical history as " "noted below.  I reviewed her chart from [facility].    HTN: current regimen is amlodipine 10 mg, metoprolol XL 12.5 mg, hydrochlorothiazide 25 mg daily, losartan 100 mg  - BP today 104/67    HLD: Atorvastatin 40 mg daily    Hx DVT/PE / protein C def: Warfarin    Aortic stenosis: Mild to mod on TTE 7/2023. Mild on TTE 10/2024.     Anxiety / MDD: Tells me this is terrible. Fecal incontinence with prozac, insomnia with lexapro, didn't like zoloft. Buspirone 15 mg TID, mirtazapine 30 mg. (Has 45 mg available) Feels this does pretty good.     Afib: seen on EKG 9/4/24, referred to cardiology that day. Started metoprolol  XL 25 mg daily.   - Had episode 3 weeks ago, took metoprolol and did convert back to sinus 3-4 hours    CAD: CAC 1836 (5/2021). Recommend stress test by Dr. Oscar 9/2024, not done. Atorvastatin 40 mg daily  -  (8/2024)    Urinary incontinence: increased recently, more urgency.     Also fecal urgency and some fecal incontinence. Very unpredictable. Takes imodium PRN, wondering if she can do this pre-emptively. Eats a lot of fiber.     Review of Systems  Constitutional, neuro, ENT, endocrine, pulmonary, cardiac, gastrointestinal, genitourinary, musculoskeletal, integument and psychiatric systems are negative, except as otherwise noted.      Objective    /67   Pulse 62   Temp 97.6  F (36.4  C) (Oral)   Resp 16   Ht 1.6 m (5' 3\")   Wt 72.3 kg (159 lb 8 oz)   SpO2 96%   BMI 28.25 kg/m    Body mass index is 28.25 kg/m .  Physical Exam   GENERAL: alert and no distress  EYES: Eyes grossly normal to inspection and conjunctivae and sclerae normal  HENT: nose and mouth without ulcers or lesions  RESP: lungs clear to auscultation - no rales, rhonchi or wheezes  CV: regular rate and rhythm, normal S1 S2, no S3 or S4, +2/6 systolic murmur, click or rub, no peripheral edema  MS: no gross musculoskeletal defects noted, no edema  SKIN: no suspicious lesions or rashes on exposed skin  NEURO: mentation " intact and speech normal  PSYCH: mentation appears normal, affect normal/bright    No results found for this or any previous visit (from the past 24 hours).        Signed Electronically by: Kristan Main MD

## 2025-04-23 NOTE — ASSESSMENT & PLAN NOTE
BP a little too soft today.   - Decrease losartan 100 -> 50 mg daily  - Continue amlodipine 10 mg, hydrochlorothiazide 25 mg and metoprolol XL 12.5 mg daily

## 2025-04-23 NOTE — LETTER
April 24, 2025      [address]abi Smith  [address]   [place] [address]        Dear ,    We are writing to inform you of your test results.    Your kidney function is stable, slightly below normal, but really normal for your age.  Electrolytes normal.     Cholesterol levels look better than they did 6 months ago.  As we discussed, with your coronary disease in mind, our LDL goal is usually 70 or less.  I would like to change your cholesterol medicine from atorvastatin to rosuvastatin 40 mg daily.  I did send this into the pharmacy.     Resulted Orders   Basic metabolic panel   Result Value Ref Range    Sodium 140 135 - 145 mmol/L    Potassium 3.5 3.4 - 5.3 mmol/L    Chloride 100 98 - 107 mmol/L    Carbon Dioxide (CO2) 25 22 - 29 mmol/L    Anion Gap 15 7 - 15 mmol/L    Urea Nitrogen 14.9 8.0 - 23.0 mg/dL    Creatinine 1.05 (H) 0.51 - 0.95 mg/dL    GFR Estimate 51 (L) >60 mL/min/1.73m2      Comment:      eGFR calculated using 2021 CKD-EPI equation.    Calcium 9.8 8.8 - 10.4 mg/dL    Glucose 99 70 - 99 mg/dL    Patient Fasting > 8hrs? Yes    Lipid panel reflex to direct LDL Fasting   Result Value Ref Range    Cholesterol 213 (H) <200 mg/dL    Triglycerides 103 <150 mg/dL    Direct Measure HDL 88 >=50 mg/dL    LDL Cholesterol Calculated 104 (H) <100 mg/dL    Non HDL Cholesterol 125 <130 mg/dL    Patient Fasting > 8hrs? Yes     Narrative    Cholesterol  Desirable: < 200 mg/dL  Borderline High: 200 - 239 mg/dL  High: >= 240 mg/dL    Triglycerides  Normal: < 150 mg/dL  Borderline High: 150 - 199 mg/dL  High: 200-499 mg/dL  Very High: >= 500 mg/dL    Direct Measure HDL  Female: >= 50 mg/dL   Male: >= 40 mg/dL    LDL Cholesterol  Desirable: < 100 mg/dL  Above Desirable: 100 - 129 mg/dL   Borderline High: 130 - 159 mg/dL   High:  160 - 189 mg/dL   Very High: >= 190 mg/dL    Non HDL Cholesterol  Desirable: < 130 mg/dL  Above Desirable: 130 - 159 mg/dL  Borderline High: 160 - 189 mg/dL  High: 190 - 219  mg/dL  Very High: >= 220 mg/dL       If you have any questions or concerns, please call the clinic at the number listed above.       Sincerely,      Kristan Main MD    Electronically signed

## 2025-04-23 NOTE — ASSESSMENT & PLAN NOTE
More of an issue at home. Discussed anselmo and importance of pelvic floor strength.   - Will refer for PFPT

## 2025-04-23 NOTE — ASSESSMENT & PLAN NOTE
Intermittent stool incontinence. Doesn't always have trigger that she needs to have BM and will still be incontinent. Keeping her from going out.   - Recommend PFPT and biofeedback, referral placed  - Okay for PRN imodium before outings

## 2025-04-23 NOTE — ASSESSMENT & PLAN NOTE
Significant issue. Has had worsening anxiety with age. Notes that things that used to not bother her are a much better deal. Not taking buspirone TID every day.   - Before we make any adjustments will have her consistently take buspirone 15 mg TID and mirtazapine 45 mg at bedtime  - F/up at [facility], have room to go up on buspirone

## 2025-04-23 NOTE — PATIENT INSTRUCTIONS
You will get a call to schedule:   - Cardiology appt  - Get set up with our anticoagulation team  - Pelvic floor PT    Decrease losartan to 50 mg.     Take buspirone 15 mg 3 times every day.     Take mirtazapine 45 mg every night.

## 2025-04-24 LAB
ANION GAP SERPL CALCULATED.3IONS-SCNC: 15 MMOL/L (ref 7–15)
BUN SERPL-MCNC: 14.9 MG/DL (ref 8–23)
CALCIUM SERPL-MCNC: 9.8 MG/DL (ref 8.8–10.4)
CHLORIDE SERPL-SCNC: 100 MMOL/L (ref 98–107)
CHOLEST SERPL-MCNC: 213 MG/DL
CREAT SERPL-MCNC: 1.05 MG/DL (ref 0.51–0.95)
EGFRCR SERPLBLD CKD-EPI 2021: 51 ML/MIN/1.73M2
FASTING STATUS PATIENT QL REPORTED: YES
FASTING STATUS PATIENT QL REPORTED: YES
GLUCOSE SERPL-MCNC: 99 MG/DL (ref 70–99)
HCO3 SERPL-SCNC: 25 MMOL/L (ref 22–29)
HDLC SERPL-MCNC: 88 MG/DL
LDLC SERPL CALC-MCNC: 104 MG/DL
NONHDLC SERPL-MCNC: 125 MG/DL
POTASSIUM SERPL-SCNC: 3.5 MMOL/L (ref 3.4–5.3)
SODIUM SERPL-SCNC: 140 MMOL/L (ref 135–145)
TRIGL SERPL-MCNC: 103 MG/DL

## 2025-04-24 RX ORDER — ATORVASTATIN CALCIUM 40 MG/1
40 TABLET, FILM COATED ORAL DAILY
Qty: 90 TABLET | Refills: 3 | Status: SHIPPED | OUTPATIENT
Start: 2025-04-24

## 2025-04-24 RX ORDER — ROSUVASTATIN CALCIUM 40 MG/1
40 TABLET, COATED ORAL DAILY
Qty: 90 TABLET | Refills: 3 | Status: SHIPPED | OUTPATIENT
Start: 2025-04-24 | End: 2025-04-24

## 2025-04-30 ENCOUNTER — PATIENT OUTREACH (OUTPATIENT)
Dept: CARE COORDINATION | Facility: CLINIC | Age: 85
End: 2025-04-30
Payer: MEDICARE

## 2025-05-14 ENCOUNTER — RESULTS FOLLOW-UP (OUTPATIENT)
Dept: ANTICOAGULATION | Facility: CLINIC | Age: 85
End: 2025-05-14

## 2025-05-14 ENCOUNTER — ANTICOAGULATION THERAPY VISIT (OUTPATIENT)
Dept: ANTICOAGULATION | Facility: CLINIC | Age: 85
End: 2025-05-14

## 2025-05-14 ENCOUNTER — LAB (OUTPATIENT)
Dept: LAB | Facility: CLINIC | Age: 85
End: 2025-05-14
Payer: MEDICARE

## 2025-05-14 DIAGNOSIS — D68.59 PROTEIN C DEFICIENCY: Primary | ICD-10-CM

## 2025-05-14 DIAGNOSIS — I48.0 PAROXYSMAL A-FIB (H): ICD-10-CM

## 2025-05-14 DIAGNOSIS — D68.59 PROTEIN C DEFICIENCY: ICD-10-CM

## 2025-05-14 LAB
INR PPP: 2.22 (ref 0.85–1.15)
PROTHROMBIN TIME: 24.7 SECONDS (ref 11.8–14.8)

## 2025-05-14 PROCEDURE — 36415 COLL VENOUS BLD VENIPUNCTURE: CPT

## 2025-05-14 PROCEDURE — 85610 PROTHROMBIN TIME: CPT

## 2025-05-14 NOTE — PROGRESS NOTES
ANTICOAGULATION MANAGEMENT     Fabi GREENWOOD Luis 85 year old female is on warfarin with therapeutic INR result. (Goal INR 2.0-3.0)    Recent labs: (last 7 days)     05/14/25  1046   INR 2.22*       ASSESSMENT     Warfarin Lab Questionnaire    Warfarin Doses Last 7 Days      5/14/2025    10:43 AM   Dose in Tablet or Mg   TAB or MG? milligram (mg)     Pt Rptd Dose SUNDAY MONDAY TUESDAY WED THURS FRIDAY SATURDAY 5/14/2025  10:43 AM 4 5 4 5 4 5 4         5/14/2025   Warfarin Lab Questionnaire   Missed doses within past 14 days? No   Changes in diet or alcohol within past 14 days? No   Medication changes since last result? No   Injuries or illness since last result? Yes   If yes, please explain: bad cold - resolved   New shortness of breath, severe headaches or sudden changes in vision since last result? No   Abnormal bleeding since last result? No   Upcoming surgery, procedure? No   Best number to call with results? [phone]     Previous result: therapeutic  Additional findings: None       PLAN     Recommended plan for temporary change(s) affecting INR     Dosing Instructions: Continue your current warfarin dose with next INR in 4 weeks       Summary  As of 5/14/2025      Full warfarin instructions:  5 mg every Mon, Wed, Fri; 4 mg all other days   Next INR check:  6/12/2025               Telephone call with Fabi who verbalizes understanding and agrees to plan    Lab visit scheduled    Education provided: Goal range and lab monitoring: goal range and significance of current result    Plan made per [facility] anticoagulation protocol    Neema Philip RN  5/14/2025  Anticoagulation Clinic  [facility] for routing messages: armida JORGE  [facility] patient phone line: [phone]        _______________________________________________________________________     Anticoagulation Episode Summary       Current INR goal:  2.0-3.0   TTR:  --   Target end date:  Indefinite   Send INR reminders to:  OTTONIEL JORGE     Indications    Protein C deficiency [D68.59]  Paroxysmal A-fib (H) [I48.0]             Comments:  --             Anticoagulation Care Providers       Provider Role Specialty Phone number    Kristan Main MD Referring Internal Medicine [phone]

## 2025-05-28 ENCOUNTER — TELEPHONE (OUTPATIENT)
Dept: INTERNAL MEDICINE | Facility: CLINIC | Age: 85
End: 2025-05-28
Payer: MEDICARE

## 2025-05-28 DIAGNOSIS — I48.0 PAROXYSMAL A-FIB (H): ICD-10-CM

## 2025-05-28 DIAGNOSIS — D68.59 PROTEIN C DEFICIENCY: Primary | ICD-10-CM

## 2025-05-28 NOTE — TELEPHONE ENCOUNTER
Called patient and left message asking that she call back with Warfarin pill size and pharmacy information so Warfarin can be refilled.  Jeovanny VASQUEZ

## 2025-05-28 NOTE — TELEPHONE ENCOUNTER
Patient contacting clinic requesting refill of Warfarin 5 mg- taken on Monday, Wednesday and Friday. Warfarin 4 mg- taken on Tuesday, Thursday, Saturday and Sunday       Needs medications refilled by Friday.       Requesting a call when this is completed. Ok to leave a detailed message if VM received.

## 2025-05-29 RX ORDER — WARFARIN SODIUM 4 MG/1
TABLET ORAL
Qty: 50 TABLET | Refills: 1 | Status: SHIPPED | OUTPATIENT
Start: 2025-05-29

## 2025-05-29 RX ORDER — WARFARIN SODIUM 5 MG/1
TABLET ORAL
Qty: 40 TABLET | Refills: 1 | Status: SHIPPED | OUTPATIENT
Start: 2025-05-29

## 2025-05-29 NOTE — TELEPHONE ENCOUNTER
Spoke with patient and she needs to have her warfarin 5 MG tabs and 4 MG tabs refilled at the [facility] in [place].  Pharmacy loaded.  She is currently taking 5 MG MWF and 4 MG all other days.      Routed to [facility] pool    Opal Arroyo RN  [facility]

## 2025-05-29 NOTE — TELEPHONE ENCOUNTER
ANTICOAGULATION MANAGEMENT:  Medication Refill    Anticoagulation Summary  As of 5/28/2025      Warfarin maintenance plan:  5 mg (5 mg x 1) every Mon, Wed, Fri; 4 mg (4 mg x 1) all other days   Next INR check:  6/12/2025   Target end date:  Indefinite    Indications    Protein C deficiency [D68.59]  Paroxysmal A-fib (H) [I48.0]                 Anticoagulation Care Providers       Provider Role Specialty Phone number    Kristan Main MD Referring Internal Medicine [phone]            Refill Criteria    Visit with referring provider/group: Meets criteria: visit within referring provider group in the last 15 months on 4/23/25    [facility] referral last signed: 04/23/2025; within last year:  Yes    Lab monitoring is up to date (not exceeding 2 weeks overdue): Yes    Fabi meets all criteria for refill. Rx instructions and quantity supplied updated to match patient's current dosing plan. Warfarin 90 day supply with 1 refill granted per [facility] protocol     Teodoro Gonzalez RN  Anticoagulation Clinic

## 2025-06-25 ENCOUNTER — DOCUMENTATION ONLY (OUTPATIENT)
Dept: ANTICOAGULATION | Facility: CLINIC | Age: 85
End: 2025-06-25
Payer: MEDICARE

## 2025-06-25 ENCOUNTER — TELEPHONE (OUTPATIENT)
Dept: ANTICOAGULATION | Facility: CLINIC | Age: 85
End: 2025-06-25
Payer: MEDICARE

## 2025-06-25 DIAGNOSIS — I48.0 PAROXYSMAL A-FIB (H): ICD-10-CM

## 2025-06-25 DIAGNOSIS — D68.59 PROTEIN C DEFICIENCY: Primary | ICD-10-CM

## 2025-06-25 NOTE — PROGRESS NOTES
ANTICOAGULATION  MANAGEMENT    Fabi Smith is being discharged from the [facility] ([facility]).    Reason for discharge: care has been transferred to [facility] Program @ [place].    Anticoagulation episode resolved, [facility] referral closed, and Standing order discontinued    If patient needs warfarin management in the future, please send a new referral    Rachell Steele RN

## 2025-06-25 NOTE — TELEPHONE ENCOUNTER
ANTICOAGULATION     Fabi Smith is overdue for an INR check.     Care Everywhere updated: Yes.     - patient is currently managed by [facility] in [place].    Spoke with Fabi who declined to schedule a lab appointment at this time. If calling back please schedule as soon as possible.   - Fabi verified her INRs and warfarin doses are managed by [facility]. -   - informed patient we will discharge from the [facility] Program..    Rachell Steele RN  6/25/2025  Anticoagulation Clinic  [facility] for routing messages: armida ALCAZAR [place]  [facility] patient phone line: [phone]

## 2025-09-02 ENCOUNTER — TELEPHONE (OUTPATIENT)
Dept: INTERNAL MEDICINE | Facility: CLINIC | Age: 85
End: 2025-09-02
Payer: MEDICARE

## 2025-09-02 DIAGNOSIS — I48.0 PAROXYSMAL A-FIB (H): Primary | ICD-10-CM

## 2025-09-02 DIAGNOSIS — Z79.01 ANTICOAGULANT LONG-TERM USE: ICD-10-CM

## 2025-09-02 DIAGNOSIS — Z86.718 HISTORY OF DEEP VENOUS THROMBOSIS: ICD-10-CM
